# Patient Record
Sex: FEMALE | Race: WHITE
[De-identification: names, ages, dates, MRNs, and addresses within clinical notes are randomized per-mention and may not be internally consistent; named-entity substitution may affect disease eponyms.]

---

## 2017-01-09 ENCOUNTER — HOSPITAL ENCOUNTER (INPATIENT)
Dept: HOSPITAL 61 - ER | Age: 82
LOS: 2 days | Discharge: NURSING, REHAB OR LONG-TERM CARE | DRG: 683 | End: 2017-01-11
Attending: INTERNAL MEDICINE | Admitting: INTERNAL MEDICINE
Payer: MEDICARE

## 2017-01-09 VITALS
DIASTOLIC BLOOD PRESSURE: 72 MMHG | DIASTOLIC BLOOD PRESSURE: 72 MMHG | SYSTOLIC BLOOD PRESSURE: 119 MMHG | SYSTOLIC BLOOD PRESSURE: 119 MMHG

## 2017-01-09 VITALS — WEIGHT: 156.13 LBS | BODY MASS INDEX: 28.73 KG/M2 | HEIGHT: 62 IN

## 2017-01-09 VITALS — DIASTOLIC BLOOD PRESSURE: 71 MMHG | SYSTOLIC BLOOD PRESSURE: 120 MMHG

## 2017-01-09 DIAGNOSIS — R55: ICD-10-CM

## 2017-01-09 DIAGNOSIS — B19.10: ICD-10-CM

## 2017-01-09 DIAGNOSIS — Z86.61: ICD-10-CM

## 2017-01-09 DIAGNOSIS — K59.00: ICD-10-CM

## 2017-01-09 DIAGNOSIS — F32.9: ICD-10-CM

## 2017-01-09 DIAGNOSIS — N17.9: Primary | ICD-10-CM

## 2017-01-09 DIAGNOSIS — Z86.73: ICD-10-CM

## 2017-01-09 DIAGNOSIS — M85.80: ICD-10-CM

## 2017-01-09 DIAGNOSIS — N18.9: ICD-10-CM

## 2017-01-09 DIAGNOSIS — E03.9: ICD-10-CM

## 2017-01-09 DIAGNOSIS — E78.5: ICD-10-CM

## 2017-01-09 DIAGNOSIS — H40.9: ICD-10-CM

## 2017-01-09 DIAGNOSIS — M48.02: ICD-10-CM

## 2017-01-09 DIAGNOSIS — Z91.013: ICD-10-CM

## 2017-01-09 DIAGNOSIS — G89.29: ICD-10-CM

## 2017-01-09 DIAGNOSIS — M54.5: ICD-10-CM

## 2017-01-09 DIAGNOSIS — Z88.0: ICD-10-CM

## 2017-01-09 DIAGNOSIS — K57.90: ICD-10-CM

## 2017-01-09 DIAGNOSIS — F20.9: ICD-10-CM

## 2017-01-09 DIAGNOSIS — F41.9: ICD-10-CM

## 2017-01-09 DIAGNOSIS — Z90.81: ICD-10-CM

## 2017-01-09 DIAGNOSIS — H54.42: ICD-10-CM

## 2017-01-09 DIAGNOSIS — Z85.72: ICD-10-CM

## 2017-01-09 DIAGNOSIS — Z96.659: ICD-10-CM

## 2017-01-09 DIAGNOSIS — Z91.012: ICD-10-CM

## 2017-01-09 DIAGNOSIS — E11.22: ICD-10-CM

## 2017-01-09 DIAGNOSIS — I50.9: ICD-10-CM

## 2017-01-09 DIAGNOSIS — Z90.710: ICD-10-CM

## 2017-01-09 DIAGNOSIS — K21.9: ICD-10-CM

## 2017-01-09 LAB
ALBUMIN SERPL-MCNC: 3.8 G/DL (ref 3.4–5)
ALBUMIN/GLOB SERPL: 0.9 {RATIO} (ref 1–1.7)
ALP SERPL-CCNC: 168 U/L (ref 46–116)
ALT SERPL-CCNC: 31 U/L (ref 14–59)
ANION GAP SERPL CALC-SCNC: 11 MMOL/L (ref 6–14)
AST SERPL-CCNC: 27 U/L (ref 15–37)
BACTERIA #/AREA URNS HPF: 0 /HPF
BASOPHILS # BLD AUTO: 0.1 X10^3/UL (ref 0–0.2)
BASOPHILS NFR BLD: 1 % (ref 0–3)
BILIRUB SERPL-MCNC: 0.4 MG/DL (ref 0.2–1)
BILIRUB UR QL STRIP: NEGATIVE
BUN SERPL-MCNC: 22 MG/DL (ref 7–20)
BUN/CREAT SERPL: 15 (ref 6–20)
CALCIUM SERPL-MCNC: 10.3 MG/DL (ref 8.5–10.1)
CHLORIDE SERPL-SCNC: 100 MMOL/L (ref 98–107)
CO2 SERPL-SCNC: 27 MMOL/L (ref 21–32)
CREAT SERPL-MCNC: 1.5 MG/DL (ref 0.6–1)
EOSINOPHIL NFR BLD: 2 % (ref 0–3)
ERYTHROCYTE [DISTWIDTH] IN BLOOD BY AUTOMATED COUNT: 15.8 % (ref 11.5–14.5)
GFR SERPLBLD BASED ON 1.73 SQ M-ARVRAT: 33.5 ML/MIN
GLOBULIN SER-MCNC: 4.3 G/DL (ref 2.2–3.8)
GLUCOSE SERPL-MCNC: 129 MG/DL (ref 70–99)
GLUCOSE UR STRIP-MCNC: NEGATIVE MG/DL
HCT VFR BLD CALC: 44.2 % (ref 36–47)
HGB BLD-MCNC: 14.2 G/DL (ref 12–15.5)
LYMPHOCYTES # BLD: 2.6 X10^3/UL (ref 1–4.8)
LYMPHOCYTES NFR BLD AUTO: 29 % (ref 24–48)
MCH RBC QN AUTO: 29 PG (ref 25–35)
MCHC RBC AUTO-ENTMCNC: 32 G/DL (ref 31–37)
MCV RBC AUTO: 89 FL (ref 79–100)
MONOCYTES NFR BLD: 15 % (ref 0–9)
NEUTROPHILS NFR BLD AUTO: 53 % (ref 31–73)
NITRITE UR QL STRIP: NEGATIVE
PH UR STRIP: 7 [PH]
PLATELET # BLD AUTO: 407 X10^3/UL (ref 140–400)
POTASSIUM SERPL-SCNC: 4.1 MMOL/L (ref 3.5–5.1)
PROT SERPL-MCNC: 8.1 G/DL (ref 6.4–8.2)
PROT UR STRIP-MCNC: NEGATIVE MG/DL
RBC # BLD AUTO: 4.96 X10^6/UL (ref 3.5–5.4)
RBC #/AREA URNS HPF: 0 /HPF (ref 0–2)
SODIUM SERPL-SCNC: 138 MMOL/L (ref 136–145)
SP GR UR STRIP: 1.01
SQUAMOUS #/AREA URNS LPF: (no result) /LPF
UROBILINOGEN UR-MCNC: 0.2 MG/DL
WBC # BLD AUTO: 8.8 X10^3/UL (ref 4–11)
WBC #/AREA URNS HPF: (no result) /HPF (ref 0–4)

## 2017-01-09 PROCEDURE — 83735 ASSAY OF MAGNESIUM: CPT

## 2017-01-09 PROCEDURE — 96360 HYDRATION IV INFUSION INIT: CPT

## 2017-01-09 PROCEDURE — 80053 COMPREHEN METABOLIC PANEL: CPT

## 2017-01-09 PROCEDURE — 81001 URINALYSIS AUTO W/SCOPE: CPT

## 2017-01-09 PROCEDURE — 93005 ELECTROCARDIOGRAM TRACING: CPT

## 2017-01-09 PROCEDURE — 85027 COMPLETE CBC AUTOMATED: CPT

## 2017-01-09 PROCEDURE — 93306 TTE W/DOPPLER COMPLETE: CPT

## 2017-01-09 PROCEDURE — 84443 ASSAY THYROID STIM HORMONE: CPT

## 2017-01-09 PROCEDURE — 36415 COLL VENOUS BLD VENIPUNCTURE: CPT

## 2017-01-09 PROCEDURE — 84484 ASSAY OF TROPONIN QUANT: CPT

## 2017-01-09 NOTE — PHYS DOC
Past Medical History


Past Medical History:  Cancer, GERD, Hypothyroid, Other


Additional Past Medical Histor:  hepatitis B, mantle cell lymphoma, bacterial 

meningitis


Past Surgical History:  Hysterectomy, Knee Replacement, Other


Additional Past Surgical Histo:  spleenectomy, bone spur removal L4&L5


Alcohol Use:  None


Drug Use:  None





Adult General


Chief Complaint


Chief Complaint:  SYNCOPE





HPI


HPI


81-year-old female presents after she had a syncopal episode. She was recently 

admitted for the same at [facility]. Today she states she was visiting 

her  in the hospital when he was being moved by the physical therapist 

she became upset and then blacked out. She denies any chest pains or 

palpitations. She denies any shortness of breath or dyspnea on exertion. []





Review of Systems


Review of Systems





Constitutional: Denies fever or chills []


Eyes: Denies change in visual acuity, redness, or eye pain []


HENT: Denies nasal congestion or sore throat []


Respiratory: Denies cough or shortness of breath []


Cardiovascular: No additional information not addressed in HPI []


GI: Denies abdominal pain, nausea, vomiting, bloody stools or diarrhea []


: Denies dysuria or hematuria []


Musculoskeletal: Denies back pain or joint pain []


Integument: Denies rash or skin lesions []


Neurologic: Denies headache, focal weakness or sensory changes []


Endocrine: Denies polyuria or polydipsia []





Current Medications


Current Medications





 Current Medications








 Medications


  (Trade)  Dose


 Ordered  Sig/Anastasia  Start Time


 Stop Time Status Last Admin


Dose Admin


 


 Sodium Chloride


  (Iv Sodium


 Chloride 0.9%


 1000ml Bag)  1,000 ml @ 


 1,000 mls/hr  1X  ONCE  1/9/17 15:45


 1/9/17 16:44 DC 1/9/17 16:50


1,000 MLS/HR











Allergies


Allergies





 Allergies








Coded Allergies Type Severity Reaction Last Updated Verified


 


  Penicillins Allergy Severe sob 1/9/17 Yes


 


  egg Allergy Intermediate  6/30/16 Yes


 


  shellfish derived Allergy Intermediate  6/30/16 Yes











Physical Exam


Physical Exam





Constitutional: Well developed, well nourished, no acute distress, non-toxic 

appearance. []


HENT: Normocephalic, atraumatic, bilateral external ears normal, oropharynx 

moist, no oral exudates, nose normal. []


Eyes: PERRLA, EOMI, conjunctiva normal, no discharge. [] 


Neck: Normal range of motion, no tenderness, supple, no stridor. [] 


Cardiovascular:Heart rate regular rhythm, no murmur []


Lungs & Thorax:  Bilateral breath sounds clear to auscultation []


Abdomen: Bowel sounds normal, soft, no tenderness, no masses, no pulsatile 

masses. [] 


Skin: Warm, dry, no erythema, no rash. [] 


Back: No tenderness, no CVA tenderness. [] 


Extremities: No tenderness, no cyanosis, no clubbing, ROM intact, no edema. [] 


Neurologic: Alert and oriented X 3, normal motor function, normal sensory 

function, no focal deficits noted. []


Psychologic: Affect normal, judgement normal, mood normal. []





Current Patient Data


Vital Signs





 Vital Signs








  Date Time  Temp Pulse Resp B/P Pulse Ox O2 Delivery O2 Flow Rate FiO2


 


1/9/17 17:31  74 20 145/63 97 Room Air  








Lab Values





 Laboratory Tests








Test


  1/9/17


16:35 1/9/17


16:50


 


Urine Color Yellow   


 


Urine Clarity Clear   


 


Urine pH 7.0   


 


Urine Specific Gravity 1.010   


 


Urine Protein


  Negativemg/dL


(NEG-TRACE) 


 


 


Urine Glucose (UA)


  Negativemg/dL


(NEG) 


 


 


Urine Ketones (Stick)


  Negativemg/dL


(NEG) 


 


 


Urine Blood


  Negative (NEG)


  


 


 


Urine Nitrite


  Negative (NEG)


  


 


 


Urine Bilirubin


  Negative (NEG)


  


 


 


Urine Urobilinogen Dipstick


  0.2mg/dL (0.2


mg/dL) 


 


 


Urine Leukocyte Esterase


  Negative (NEG)


  


 


 


Urine RBC 0/HPF (0-2)   


 


Urine WBC Occ/HPF (0-4)   


 


Urine Squamous Epithelial


Cells Occ/LPF  


  


 


 


Urine Bacteria 0/HPF (0-FEW)   


 


Urine Hyaline Casts Moderate/HPF   


 


White Blood Count


  


  8.8x10^3/uL


(4.0-11.0)


 


Red Blood Count


  


  4.96x10^6/uL


(3.50-5.40)


 


Hemoglobin


  


  14.2g/dL


(12.0-15.5)


 


Hematocrit


  


  44.2%


(36.0-47.0)


 


Mean Corpuscular Volume  89fL ()  


 


Mean Corpuscular Hemoglobin  29pg (25-35)  


 


Mean Corpuscular Hemoglobin


Concent 


  32g/dL (31-37)


 


 


Red Cell Distribution Width


  


  15.8%


(11.5-14.5)  H


 


Platelet Count


  


  407x10^3/uL


(140-400)  H


 


Neutrophils (%) (Auto)  53% (31-73)  


 


Lymphocytes (%) (Auto)  29% (24-48)  


 


Monocytes (%) (Auto)  15% (0-9)  H


 


Eosinophils (%) (Auto)  2% (0-3)  


 


Basophils (%) (Auto)  1% (0-3)  


 


Neutrophils # (Auto)


  


  4.6x10^3uL


(1.8-7.7)


 


Lymphocytes # (Auto)


  


  2.6x10^3/uL


(1.0-4.8)


 


Monocytes # (Auto)


  


  1.3x10^3/uL


(0.0-1.1)  H


 


Eosinophils # (Auto)


  


  0.2x10^3/uL


(0.0-0.7)


 


Basophils # (Auto)


  


  0.1x10^3/uL


(0.0-0.2)


 


Sodium Level


  


  138mmol/L


(136-145)


 


Potassium Level


  


  4.1mmol/L


(3.5-5.1)


 


Chloride Level


  


  100mmol/L


()


 


Carbon Dioxide Level


  


  27mmol/L


(21-32)


 


Anion Gap  11 (6-14)  


 


Blood Urea Nitrogen


  


  22mg/dL (7-20)


H


 


Creatinine


  


  1.5mg/dL


(0.6-1.0)  H


 


Estimated GFR


(Cockcroft-Gault) 


  33.5  


 


 


BUN/Creatinine Ratio  15 (6-20)  


 


Glucose Level


  


  129mg/dL


(70-99)  H


 


Calcium Level


  


  10.3mg/dL


(8.5-10.1)  H


 


Total Bilirubin


  


  0.4mg/dL


(0.2-1.0)


 


Aspartate Amino Transferase


(AST) 


  27U/L (15-37)  


 


 


Alanine Aminotransferase (ALT)  31U/L (14-59)  


 


Alkaline Phosphatase


  


  168U/L


()  H


 


Troponin I Quantitative


  


  < 0.017ng/mL


(0.000-0.055)


 


Total Protein


  


  8.1g/dL


(6.4-8.2)


 


Albumin


  


  3.8g/dL


(3.4-5.0)


 


Albumin/Globulin Ratio


  


  0.9 (1.0-1.7)


L





 Laboratory Tests


1/9/17 16:50








 Laboratory Tests


1/9/17 16:50














EKG


EKG


EKG: Normal sinus rhythm rate of 75 without ischemic ST-T changes []





Radiology/Procedures


Radiology/Procedures


[]





Course & Med Decision Making


Course & Med Decision Making


Pertinent Labs and Imaging studies reviewed. (See chart for details)





[ED course: Evaluation reveals a 81-year-old female with what sounds like a 

vasovagal syncopal episode while in the hospital visiting her . During 

her stay in the emergency department she did have several episodes where she 

was symptomatic with her heart rate running into the 130s. I spoke with the 

hospitalist who agreed to accept the patient for admission]





Dragon Disclaimer


Dragon Disclaimer


This electronic medical record was generated, in whole or in part, using a 

voice recognition dictation system.





Departure


Departure


Impression:  


 Primary Impression:  


 Syncope


Disposition:  09 ADMITTED AS INPATIENT


Admitting Physician:  Iona Ventura


Condition:  STABLE








TALISHA BEST DO Jan 9, 2017 19:35

## 2017-01-09 NOTE — EKG
[facility]

              [address] [place] [address]

Test Date:    2017               Test Time:    15:05:06

Pat Name:     LUPE FRIEND          Department:   

Patient ID:   [id number]           Room:          

Gender:       F                        Technician:   

:          [id number]               Requested By: TALISHA BEST

Order Number: [id number]            Reading MD:   Tramaine Olmedo

                                 Measurements

Intervals                              Axis          

Rate:         75                       P:            48

NJ:           168                      QRS:          -31

QRSD:         86                       T:            42

QT:           368                                    

QTc:          413                                    

                           Interpretive Statements

SINUS RHYTHM

ABNORMAL LEFT AXIS DEVIATION

LEFT ANTERIOR FASCICULAR BLOCK



Electronically Signed On 1- 15:43:12 CST by Tramaine Olmedo

## 2017-01-09 NOTE — PDOC1
History and Physical


Date of Admission


Date of Admission


DATE: 1/9/17 


TIME: 23:10





Identification/Chief Complaint


Chief Complaint


syncope





Source


Source:  Chart review, Patient





History of Present Illness


History of Present Illness


pt well known to me,  prior admit for bacterial meningitis, long stay, 

including LTAC last year


She now was admitted from ER for syncope.   Lost consciousness in her husbands 

roomm,  536 here.  she is upset about his condition.


She was just discharged from [facility] today, s./p syncope w/u,  she is 

scheduled for an echo, and to f/u with Dr. Honorio mclani.





Past Medical History


Cardiovascular:  No pertinent hx


Pulmonary:  No pertinent hx


CENTRAL NERVOUS SYSTEM:  Other


GI:  Diverticulosis, GERD


Heme/Onc:  No pertinent hx, Other


Hepatobiliary:  Hep A/B/C


Psych:  No pertinent hx


Musculoskeletal:   low back pain, Osteoarthritis


Infectious disease:  No pertinent hx


Renal/:  No pertinent hx, Benign prostatic enlarg.


Endocrine:  No pertinent hx





Past Surgical History


Past Surgical History:  Other





Family History


Family History:  No Significant





Social History


Smoke:  No


ALCOHOL:  none


Drugs:  None





Current Problem List


Problem List


 Problems


Medical Problems:


(1) Syncope


Status: Acute  








Problems:  





Current Medications


Current Medications





 Current Medications


Sodium Chloride (Iv Sodium Chloride 0.9% 1000ml Bag) 1,000 ml @  1,000 mls/hr 

1X  ONCE IV  Last administered on 1/9/17at 16:50;  Start 1/9/17 at 15:45;  Stop 

1/9/17 at 16:44;  Status DC


Bumetanide (Bumex) 1 mg QSUTUTH PO ;  Start 1/10/17 at 16:00


Bupropion HCl (Wellbutrin Sr) 100 mg DAILY PO ;  Start 1/10/17 at 09:00


Cyclopentolate HCl (Cyclogyl) 1 drop DAILY OS ;  Start 1/10/17 at 09:00


Docusate Sodium (Colace) 100 mg BID PO ;  Start 1/10/17 at 09:00


Famotidine (Pepcid) 20 mg DAILY PO ;  Start 1/10/17 at 09:00


Levofloxacin (Levaquin) 500 mg 1X  ONCE PO ;  Start 1/10/17 at 06:00;  Stop 1/10

/17 at 06:01


Levothyroxine Sodium (Synthroid) 25 mcg DAILY07 PO ;  Start 1/10/17 at 07:00


Magnesium Oxide (Magnesium Oxide) 400 mg TIDWMEALS PO ;  Start 1/10/17 at 08:00


Polyethylene Glycol (miraLAX PACKET) 17 gm DAILY PO ;  Start 1/10/17 at 09:00


Spironolactone (Aldactone) 25 mg DAILY PO ;  Start 1/10/17 at 09:00


Levofloxacin (Levaquin) 250 mg DAILY06 PO ;  Start 1/11/17 at 06:00





Active Scripts


Active


Nystop (Nystatin) 60 Gm Powder 1 Asim TP TID


Mag-Oxide (Magnesium Oxide) 400 Mg Tablet 400 Mg PO TIDWMEALS


Lidoderm (Lidocaine) 700 Mg Adh..patch 1 Patch TD DAILY


Ciprofloxacin Hcl 2.5 Ml Drops 1 Drop OS QID


Baclofen 10 Mg Tablet 10 Mg PO PRN Q8HRS PRN


Atorvastatin Calcium 10 Mg Tablet 10 Mg PO QHS


Aspirin 325 Mg Tablet 325 Mg PO DAILYWBKFT


Reported


Bumetanide 1 Mg Tablet   QSUTUTH


Synthroid (Levothyroxine Sodium) 25 Mcg Tablet 1 Tab PO DAILY


Colace (Docusate Sodium) 100 Mg Capsule 1 Cap PO BID


Levaquin (Levofloxacin) 500 Mg Tablet 1 Tab PO DAILY


Pred-G 1% Eye Drops (Gentamicin/Prednisol Ac) 5 Ml Drops.susp   QID


Cyclopentolate Hcl 2 Ml Drops   


Bupropion Hcl Sr (Bupropion Hcl) 100 Mg Tablet.er   DAILY


Famotidine 20 Mg Tablet 1 Tab PO BID


Miralax (Polyethylene Glycol 3350) 17 Gm Powd.pack 1 Packet PO DAILY


Aldactone (Spironolactone) 25 Mg Tablet 25 Mg PO DAILY





Allergies


Allergies:  


Coded Allergies:  


     Penicillins (Verified  Allergy, Severe, sob, 1/9/17)


     egg (Verified  Allergy, Intermediate, 6/30/16)


     shellfish derived (Verified  Allergy, Intermediate, 6/30/16)





ROS


General:  YES: Appetite, 


   No: Chills, Fatigue, Malaise, Night Sweats, Other


PSYCHOLOGICAL ROS:  YES: Concentration difficultie, 


   No: Anxiety, Behavioral Disorder, Decreased libido, Depression, 

Disorientation, Hallucinations, Hostility, Irritablity, Memory difficulties, 

Mood Swings, Other, Physical abuse, Sexual abuse, Sleep disturbances, Suicidal 

ideation


Eyes:  No Blurry vision, No Decreased vision, No Double vision, No Dry eyes, No 

Excessive tearing, No Eye Pain, No Itchy Eyes, No Loss of vision, No Other, No 

Photophobia, No Scotomata, No Uses contacts, No Uses glasses


Respiratory:  No: Cough, Hemoptysis, Orthopnea, Other, Pleuritic Pain, SOB with 

excertion, Shortness of breath, Sputum Changes, Stridor, Tachypnea, Wheezing


Cardiovascular:  No Chest Pain, No Edema, No Lt Headedness, No Orthopnea, No 

Other, No Palpitations, No Paroxysmal Noc. Dyspnea


Gastrointestinal:  No Abdominal Pain, No Constipation, No Diarrhea, No 

Hematochezia, No Melena, No Nausea, No Other, No Vomiting


Genitourinary:  No , No , No , No , No , No , No , No Discharge, No Dysuria, No 

Flank Pain, No Frequency, No Hematuria, No Incontinence, No Other, No Pain, No 

Retention, No Urgency


Musculoskeletal:  No Gait Disturbance, No Joint Pain, No Joint Stiffness, No 

Joint Swelling, No Muscle Pain, No Muscular Weakness, No Other, No Pain In:, No 

Swelling In:


Neurological:  Yes Other, 


   No Behavorial Changes, No Bowel/Bladder ControlChng, No Confusion, No 

Dizziness, No Gait Disturbance, No Headaches, No Impaired Coord/balance, No 

Memory Loss, No Numbness/Tingling, No Seizures, No Speech Problems, No Tremors, 

No Visual Changes, No Weakness


Skin:  No Acne, No Dry Skin, No Eczema, No Hair Changes, No Lumps, No Mole 

Changes, No Mottling, No Nail Changes, No Other, No Pruritus, No Rash, No Skin 

Lesion Changes





Physical Exam


General:  Alert, Oriented X3, No acute distress


HEENT:  Atraumatic, EOMI, Mucous membr. moist/pink


Lungs:  Normal air movement


Abdomen:  Normal bowel sounds, Soft, No tenderness


Rectal Exam:  not examined


Extremities:  No clubbing, No cyanosis, No edema, Normal pulses


Skin:  No rashes, No breakdown, No significant lesion


Neuro:  Normal speech, Sensation intact


Psych/Mental Status:  Mental status NL, Mood NL





Vitals


Vitals





 Vital Signs








  Date Time  Temp Pulse Resp B/P Pulse Ox O2 Delivery O2 Flow Rate FiO2


 


1/9/17 19:04  85 16 138/63 97 Room Air  











Labs


Labs





Laboratory Tests








Test


  1/9/17


16:35 1/9/17


16:50


 


Urine Color Yellow  


 


Urine Clarity Clear  


 


Urine pH 7.0  


 


Urine Specific Gravity 1.010  


 


Urine Protein


  Negativemg/dL


(NEG-TRACE) 


 


 


Urine Glucose (UA)


  Negativemg/dL


(NEG) 


 


 


Urine Ketones (Stick)


  Negativemg/dL


(NEG) 


 


 


Urine Blood Negative (NEG)  


 


Urine Nitrite Negative (NEG)  


 


Urine Bilirubin Negative (NEG)  


 


Urine Urobilinogen Dipstick


  0.2mg/dL (0.2


mg/dL) 


 


 


Urine Leukocyte Esterase Negative (NEG)  


 


Urine RBC 0/HPF (0-2)  


 


Urine WBC Occ/HPF (0-4)  


 


Urine Squamous Epithelial


Cells Occ/LPF 


  


 


 


Urine Bacteria 0/HPF (0-FEW)  


 


Urine Hyaline Casts Moderate/HPF  


 


White Blood Count


  


  8.8x10^3/uL


(4.0-11.0)


 


Red Blood Count


  


  4.96x10^6/uL


(3.50-5.40)


 


Hemoglobin


  


  14.2g/dL


(12.0-15.5)


 


Hematocrit


  


  44.2%


(36.0-47.0)


 


Mean Corpuscular Volume  89fL () 


 


Mean Corpuscular Hemoglobin  29pg (25-35) 


 


Mean Corpuscular Hemoglobin


Concent 


  32g/dL (31-37) 


 


 


Red Cell Distribution Width


  


  15.8%


(11.5-14.5)


 


Platelet Count


  


  407x10^3/uL


(140-400)


 


Neutrophils (%) (Auto)  53% (31-73) 


 


Lymphocytes (%) (Auto)  29% (24-48) 


 


Monocytes (%) (Auto)  15% (0-9) 


 


Eosinophils (%) (Auto)  2% (0-3) 


 


Basophils (%) (Auto)  1% (0-3) 


 


Neutrophils # (Auto)


  


  4.6x10^3uL


(1.8-7.7)


 


Lymphocytes # (Auto)


  


  2.6x10^3/uL


(1.0-4.8)


 


Monocytes # (Auto)


  


  1.3x10^3/uL


(0.0-1.1)


 


Eosinophils # (Auto)


  


  0.2x10^3/uL


(0.0-0.7)


 


Basophils # (Auto)


  


  0.1x10^3/uL


(0.0-0.2)


 


Sodium Level


  


  138mmol/L


(136-145)


 


Potassium Level


  


  4.1mmol/L


(3.5-5.1)


 


Chloride Level


  


  100mmol/L


()


 


Carbon Dioxide Level


  


  27mmol/L


(21-32)


 


Anion Gap  11 (6-14) 


 


Blood Urea Nitrogen  22mg/dL (7-20) 


 


Creatinine


  


  1.5mg/dL


(0.6-1.0)


 


Estimated GFR


(Cockcroft-Gault) 


  33.5 


 


 


BUN/Creatinine Ratio  15 (6-20) 


 


Glucose Level


  


  129mg/dL


(70-99)


 


Calcium Level


  


  10.3mg/dL


(8.5-10.1)


 


Total Bilirubin


  


  0.4mg/dL


(0.2-1.0)


 


Aspartate Amino Transf


(AST/SGOT) 


  27U/L (15-37) 


 


 


Alanine Aminotransferase


(ALT/SGPT) 


  31U/L (14-59) 


 


 


Alkaline Phosphatase


  


  168U/L


()


 


Troponin I Quantitative


  


  < 0.017ng/mL


(0.000-0.055)


 


Total Protein


  


  8.1g/dL


(6.4-8.2)


 


Albumin


  


  3.8g/dL


(3.4-5.0)


 


Albumin/Globulin Ratio  0.9 (1.0-1.7) 








Laboratory Tests








Test


  1/9/17


16:35 1/9/17


16:50


 


Urine Color Yellow  


 


Urine Clarity Clear  


 


Urine pH 7.0  


 


Urine Specific Gravity 1.010  


 


Urine Protein


  Negativemg/dL


(NEG-TRACE) 


 


 


Urine Glucose (UA)


  Negativemg/dL


(NEG) 


 


 


Urine Ketones (Stick)


  Negativemg/dL


(NEG) 


 


 


Urine Blood Negative (NEG)  


 


Urine Nitrite Negative (NEG)  


 


Urine Bilirubin Negative (NEG)  


 


Urine Urobilinogen Dipstick


  0.2mg/dL (0.2


mg/dL) 


 


 


Urine Leukocyte Esterase Negative (NEG)  


 


Urine RBC 0/HPF (0-2)  


 


Urine WBC Occ/HPF (0-4)  


 


Urine Squamous Epithelial


Cells Occ/LPF 


  


 


 


Urine Bacteria 0/HPF (0-FEW)  


 


Urine Hyaline Casts Moderate/HPF  


 


White Blood Count


  


  8.8x10^3/uL


(4.0-11.0)


 


Red Blood Count


  


  4.96x10^6/uL


(3.50-5.40)


 


Hemoglobin


  


  14.2g/dL


(12.0-15.5)


 


Hematocrit


  


  44.2%


(36.0-47.0)


 


Mean Corpuscular Volume  89fL () 


 


Mean Corpuscular Hemoglobin  29pg (25-35) 


 


Mean Corpuscular Hemoglobin


Concent 


  32g/dL (31-37) 


 


 


Red Cell Distribution Width


  


  15.8%


(11.5-14.5)


 


Platelet Count


  


  407x10^3/uL


(140-400)


 


Neutrophils (%) (Auto)  53% (31-73) 


 


Lymphocytes (%) (Auto)  29% (24-48) 


 


Monocytes (%) (Auto)  15% (0-9) 


 


Eosinophils (%) (Auto)  2% (0-3) 


 


Basophils (%) (Auto)  1% (0-3) 


 


Neutrophils # (Auto)


  


  4.6x10^3uL


(1.8-7.7)


 


Lymphocytes # (Auto)


  


  2.6x10^3/uL


(1.0-4.8)


 


Monocytes # (Auto)


  


  1.3x10^3/uL


(0.0-1.1)


 


Eosinophils # (Auto)


  


  0.2x10^3/uL


(0.0-0.7)


 


Basophils # (Auto)


  


  0.1x10^3/uL


(0.0-0.2)


 


Sodium Level


  


  138mmol/L


(136-145)


 


Potassium Level


  


  4.1mmol/L


(3.5-5.1)


 


Chloride Level


  


  100mmol/L


()


 


Carbon Dioxide Level


  


  27mmol/L


(21-32)


 


Anion Gap  11 (6-14) 


 


Blood Urea Nitrogen  22mg/dL (7-20) 


 


Creatinine


  


  1.5mg/dL


(0.6-1.0)


 


Estimated GFR


(Cockcroft-Gault) 


  33.5 


 


 


BUN/Creatinine Ratio  15 (6-20) 


 


Glucose Level


  


  129mg/dL


(70-99)


 


Calcium Level


  


  10.3mg/dL


(8.5-10.1)


 


Total Bilirubin


  


  0.4mg/dL


(0.2-1.0)


 


Aspartate Amino Transf


(AST/SGOT) 


  27U/L (15-37) 


 


 


Alanine Aminotransferase


(ALT/SGPT) 


  31U/L (14-59) 


 


 


Alkaline Phosphatase


  


  168U/L


()


 


Troponin I Quantitative


  


  < 0.017ng/mL


(0.000-0.055)


 


Total Protein


  


  8.1g/dL


(6.4-8.2)


 


Albumin


  


  3.8g/dL


(3.4-5.0)


 


Albumin/Globulin Ratio  0.9 (1.0-1.7) 











VTE Prophylaxis Ordered


VTE Prophylaxis Devices:  Yes


VTE Pharmacological Prophylaxi:  Yes





Assessment/Plan


Assessment/Plan


syncope,  poss vagal, consult CV< she was supposed to f.u with them soon


check echo





admit, obs








MARC MALAGON MD Jan 9, 2017 23:14

## 2017-01-10 VITALS — DIASTOLIC BLOOD PRESSURE: 59 MMHG | SYSTOLIC BLOOD PRESSURE: 122 MMHG

## 2017-01-10 VITALS — SYSTOLIC BLOOD PRESSURE: 123 MMHG | DIASTOLIC BLOOD PRESSURE: 59 MMHG

## 2017-01-10 VITALS — DIASTOLIC BLOOD PRESSURE: 39 MMHG | SYSTOLIC BLOOD PRESSURE: 94 MMHG

## 2017-01-10 VITALS — DIASTOLIC BLOOD PRESSURE: 63 MMHG | SYSTOLIC BLOOD PRESSURE: 120 MMHG

## 2017-01-10 VITALS — DIASTOLIC BLOOD PRESSURE: 67 MMHG | SYSTOLIC BLOOD PRESSURE: 121 MMHG

## 2017-01-10 VITALS — SYSTOLIC BLOOD PRESSURE: 113 MMHG | DIASTOLIC BLOOD PRESSURE: 59 MMHG

## 2017-01-10 VITALS — DIASTOLIC BLOOD PRESSURE: 59 MMHG | SYSTOLIC BLOOD PRESSURE: 112 MMHG

## 2017-01-10 VITALS — SYSTOLIC BLOOD PRESSURE: 112 MMHG | DIASTOLIC BLOOD PRESSURE: 83 MMHG

## 2017-01-10 VITALS — DIASTOLIC BLOOD PRESSURE: 62 MMHG | SYSTOLIC BLOOD PRESSURE: 115 MMHG

## 2017-01-10 RX ADMIN — POLYETHYLENE GLYCOL 3350 SCH GM: 17 POWDER, FOR SOLUTION ORAL at 08:55

## 2017-01-10 RX ADMIN — CIPROFLOXACIN HYDROCHLORIDE SCH DROP: 3 SOLUTION/ DROPS OPHTHALMIC at 20:51

## 2017-01-10 RX ADMIN — DOCUSATE SODIUM SCH MG: 100 CAPSULE, LIQUID FILLED ORAL at 20:51

## 2017-01-10 RX ADMIN — MAGNESIUM OXIDE TAB 400 MG (241.3 MG ELEMENTAL MG) SCH MG: 400 (241.3 MG) TAB at 08:55

## 2017-01-10 RX ADMIN — DOCUSATE SODIUM SCH MG: 100 CAPSULE, LIQUID FILLED ORAL at 08:56

## 2017-01-10 RX ADMIN — ASPIRIN 325 MG ORAL TABLET SCH MG: 325 PILL ORAL at 08:55

## 2017-01-10 RX ADMIN — CIPROFLOXACIN HYDROCHLORIDE SCH DROP: 3 SOLUTION/ DROPS OPHTHALMIC at 08:57

## 2017-01-10 RX ADMIN — MAGNESIUM OXIDE TAB 400 MG (241.3 MG ELEMENTAL MG) SCH MG: 400 (241.3 MG) TAB at 12:30

## 2017-01-10 RX ADMIN — TOBRAMYCIN AND DEXAMETHASONE SCH DROP: 3; 1 SUSPENSION/ DROPS OPHTHALMIC at 23:54

## 2017-01-10 RX ADMIN — BUPROPION HYDROCHLORIDE SCH MG: 100 TABLET, EXTENDED RELEASE ORAL at 08:56

## 2017-01-10 RX ADMIN — CYCLOPENTOLATE HYDROCHLORIDE SCH DROP: 10 SOLUTION OPHTHALMIC at 08:53

## 2017-01-10 RX ADMIN — SPIRONOLACTONE SCH MG: 25 TABLET ORAL at 08:56

## 2017-01-10 RX ADMIN — MAGNESIUM OXIDE TAB 400 MG (241.3 MG ELEMENTAL MG) SCH MG: 400 (241.3 MG) TAB at 17:04

## 2017-01-10 RX ADMIN — LEVOTHYROXINE SODIUM SCH MCG: 25 TABLET ORAL at 06:24

## 2017-01-10 RX ADMIN — FAMOTIDINE SCH MG: 20 TABLET, FILM COATED ORAL at 08:58

## 2017-01-10 RX ADMIN — CIPROFLOXACIN HYDROCHLORIDE SCH DROP: 3 SOLUTION/ DROPS OPHTHALMIC at 17:04

## 2017-01-10 RX ADMIN — CIPROFLOXACIN HYDROCHLORIDE SCH DROP: 3 SOLUTION/ DROPS OPHTHALMIC at 12:30

## 2017-01-10 NOTE — CARD
--------------- APPROVED REPORT --------------





EXAM: Two-dimensional and M-mode echocardiogram with Doppler and color Doppler.



Other Information 

Quality : Average

Rhythm : NSR



INDICATION

Syncope 



2D DIMENSIONS 

RVDd2.5 (2.9-3.5cm)Left Atrium(2D)3.0 (1.6-4.0cm)

IVSd0.8 (0.7-1.1cm)Aortic Root(2D)2.3 (2.0-3.7cm)

LVDd4.1 (3.9-5.9cm)LVOT Diameter2.0 (1.8-2.4cm)

PWd0.8 (0.7-1.1cm)LVDs2.5 (2.5-4.0cm)

FS (%) 32.8 %SV50.2 ml

LVEF(%)64.4 (>50%)



Aortic Valve

AoV Peak Maximilian.137.7cm/sAoV VTI26.7cm

AO Peak GR.7.6mmHgLVOT Peak Maximilian.79.8cm/s

LVOT  VTI 15.03cmAO Mean GR.4mmHg

KIKO (VMAX)1.56ts1MZN   (VTI)1.74cm2



Mitral Valve

MV E Rnbzuejo793.6cm/sMV DECEL [id number]

MV A Jkzsfknd912.2cm/sMV E Mean Gr.3mmHg

MV EMG19svK/A  Ratio0.9

MV A Ycsnbymy057bkNNL (PHT)4.16cm2



TDI

E/Lateral E'13.5E/Medial E'15.5



Pulmonary Valve

PV Peak Cfnnmytf11.3cm/sPV Peak Grad.2mmHg

RVOT VTI12.7cm



Tricuspid Valve

TR P. Nyuvoivm977nm/sRAP GPAUUTIK7jrTl

TR Peak Gr.33kcYlFMAS73cxYo



Pulmonary Vein

S1 Hcieadgu39.3cm/sD2 Fuqlrxdy77.1cm/s



 LEFT VENTRICLE 

The left ventricle is normal size. There is normal left ventricular wall thickness. Left ventricle sy
stolic function is normal. The Ejection Fraction is 60-65%. There is normal LV segmental wall motion.
 The left ventricular diastolic function and filling is normal for age.



 RIGHT VENTRICLE 

The right ventricle is normal size. The right ventricular systolic function is normal.



 ATRIA 

The left atrium size is normal. The right atrium size is normal. The interatrial septum is intact wit
h no evidence for an atrial septal defect or patent foramen ovale as noted on 2-D or Doppler imaging.




 AORTIC VALVE 

The aortic valve is normal in structure and function. The aortic valve is trileaflet. Doppler and Col
or Flow revealed no significant aortic regurgitation. There is no significant aortic valvular stenosi
s.



 MITRAL VALVE 

The mitral valve is normal in structure and function. There is no mitral valve stenosis. Doppler and 
Color Flow revealed mild mitral regurgitation.



 TRICUSPID VALVE 

The tricuspid valve is normal in structure and function. Doppler and Color Flow revealed mild tricusp
id regurgitation. The PA pressure was estimated at 27 mmHg. There is no tricuspid valve stenosis.



 PULMONIC VALVE 

The pulmonic valve is not well visualized. Doppler and Color Flow revealed no pulmonic valvular regur
gitation. There is no pulmonic valvular stenosis.



 GREAT VESSELS 

The aortic root is normal in size. Normal pulmonary venous flow (Doppler). The IVC is normal in size 
and collapses >50% with inspiration.



 PERICARDIAL EFFUSION 

There is no evidence of significant pericardial effusion.



Critical Notification

Critical Value: No



<Conclusion>

Left ventricle systolic function is normal. The Ejection Fraction is 60-65%.

There is normal LV segmental wall motion.

## 2017-01-10 NOTE — PDOC
PROGRESS NOTES


Chief Complaint


Chief Complaint


syncope





History of Present Illness


History of Present Illness


1. VAso vaga; syncope


2. Anxiety sec to 's current medical condition


3. SHAYE on CKD: 


4. Hypothyroidism: On replacement. 


5. Hx of chronic back pain


6. Hx of TIA and lymphoma 


7. HLP:


8. Hx of meningitis and encephalopathy





Vitals


Vitals


NO issues


CARds note reviewed, echo then possible event monitor if echo unrevealing


But likely vaso timmy brought about by severe anxiety from husbands worsening 

dementia (did not recognize her)


 Vital Signs








  Date Time  Temp Pulse Resp B/P Pulse Ox O2 Delivery O2 Flow Rate FiO2


 


1/10/17 13:10  83  121/67    


 


1/10/17 11:00 96.3  20  98 Room Air  





 96.3       











Physical Exam


General:  Alert, Oriented X3, Cooperative, No acute distress


Heart:  Regular rate, Normal S1, Normal S2, Other (2/6 systolic murmur to LLS 

border)


Lungs:  Clear


Abdomen:  Soft, No tenderness


Extremities:  No cyanosis, No edema


Skin:  No breakdown, No significant lesion





Labs


LABS





Laboratory Tests








Test


  1/9/17


16:35 1/9/17


16:50


 


Urine Color Yellow  


 


Urine Clarity Clear  


 


Urine pH 7.0  


 


Urine Specific Gravity 1.010  


 


Urine Protein


  Negativemg/dL


(NEG-TRACE) 


 


 


Urine Glucose (UA)


  Negativemg/dL


(NEG) 


 


 


Urine Ketones (Stick)


  Negativemg/dL


(NEG) 


 


 


Urine Blood Negative (NEG)  


 


Urine Nitrite Negative (NEG)  


 


Urine Bilirubin Negative (NEG)  


 


Urine Urobilinogen Dipstick


  0.2mg/dL (0.2


mg/dL) 


 


 


Urine Leukocyte Esterase Negative (NEG)  


 


Urine RBC 0/HPF (0-2)  


 


Urine WBC Occ/HPF (0-4)  


 


Urine Squamous Epithelial


Cells Occ/LPF 


  


 


 


Urine Bacteria 0/HPF (0-FEW)  


 


Urine Hyaline Casts Moderate/HPF  


 


White Blood Count


  


  8.8x10^3/uL


(4.0-11.0)


 


Red Blood Count


  


  4.96x10^6/uL


(3.50-5.40)


 


Hemoglobin


  


  14.2g/dL


(12.0-15.5)


 


Hematocrit


  


  44.2%


(36.0-47.0)


 


Mean Corpuscular Volume  89fL () 


 


Mean Corpuscular Hemoglobin  29pg (25-35) 


 


Mean Corpuscular Hemoglobin


Concent 


  32g/dL (31-37) 


 


 


Red Cell Distribution Width


  


  15.8%


(11.5-14.5)


 


Platelet Count


  


  407x10^3/uL


(140-400)


 


Neutrophils (%) (Auto)  53% (31-73) 


 


Lymphocytes (%) (Auto)  29% (24-48) 


 


Monocytes (%) (Auto)  15% (0-9) 


 


Eosinophils (%) (Auto)  2% (0-3) 


 


Basophils (%) (Auto)  1% (0-3) 


 


Neutrophils # (Auto)


  


  4.6x10^3uL


(1.8-7.7)


 


Lymphocytes # (Auto)


  


  2.6x10^3/uL


(1.0-4.8)


 


Monocytes # (Auto)


  


  1.3x10^3/uL


(0.0-1.1)


 


Eosinophils # (Auto)


  


  0.2x10^3/uL


(0.0-0.7)


 


Basophils # (Auto)


  


  0.1x10^3/uL


(0.0-0.2)


 


Sodium Level


  


  138mmol/L


(136-145)


 


Potassium Level


  


  4.1mmol/L


(3.5-5.1)


 


Chloride Level


  


  100mmol/L


()


 


Carbon Dioxide Level


  


  27mmol/L


(21-32)


 


Anion Gap  11 (6-14) 


 


Blood Urea Nitrogen  22mg/dL (7-20) 


 


Creatinine


  


  1.5mg/dL


(0.6-1.0)


 


Estimated GFR


(Cockcroft-Gault) 


  33.5 


 


 


BUN/Creatinine Ratio  15 (6-20) 


 


Glucose Level


  


  129mg/dL


(70-99)


 


Calcium Level


  


  10.3mg/dL


(8.5-10.1)


 


Total Bilirubin


  


  0.4mg/dL


(0.2-1.0)


 


Aspartate Amino Transf


(AST/SGOT) 


  27U/L (15-37) 


 


 


Alanine Aminotransferase


(ALT/SGPT) 


  31U/L (14-59) 


 


 


Alkaline Phosphatase


  


  168U/L


()


 


Troponin I Quantitative


  


  < 0.017ng/mL


(0.000-0.055)


 


Total Protein


  


  8.1g/dL


(6.4-8.2)


 


Albumin


  


  3.8g/dL


(3.4-5.0)


 


Albumin/Globulin Ratio  0.9 (1.0-1.7) 











Review of Systems


Review of Systems


all 14 pt reviewed, only positive is anxiety - gets teary eyed when talking 

about her 





Assessment and Plan


Assessmemt and Plan


ff up echo result


Await PT/OT


Check TSH


Possible event monitor as oP\


HOme sergey if all else is neg Problems


Medical Problems:


(1) Syncope


Status: Acute  





Problems:  





Comment


Review of Relevant


I have reviewed the following items lala (where applicable) has been applied.


Labs





Laboratory Tests








Test


  1/9/17


16:35 1/9/17


16:50


 


Urine Color Yellow  


 


Urine Clarity Clear  


 


Urine pH 7.0  


 


Urine Specific Gravity 1.010  


 


Urine Protein


  Negativemg/dL


(NEG-TRACE) 


 


 


Urine Glucose (UA)


  Negativemg/dL


(NEG) 


 


 


Urine Ketones (Stick)


  Negativemg/dL


(NEG) 


 


 


Urine Blood Negative (NEG)  


 


Urine Nitrite Negative (NEG)  


 


Urine Bilirubin Negative (NEG)  


 


Urine Urobilinogen Dipstick


  0.2mg/dL (0.2


mg/dL) 


 


 


Urine Leukocyte Esterase Negative (NEG)  


 


Urine RBC 0/HPF (0-2)  


 


Urine WBC Occ/HPF (0-4)  


 


Urine Squamous Epithelial


Cells Occ/LPF 


  


 


 


Urine Bacteria 0/HPF (0-FEW)  


 


Urine Hyaline Casts Moderate/HPF  


 


White Blood Count


  


  8.8x10^3/uL


(4.0-11.0)


 


Red Blood Count


  


  4.96x10^6/uL


(3.50-5.40)


 


Hemoglobin


  


  14.2g/dL


(12.0-15.5)


 


Hematocrit


  


  44.2%


(36.0-47.0)


 


Mean Corpuscular Volume  89fL () 


 


Mean Corpuscular Hemoglobin  29pg (25-35) 


 


Mean Corpuscular Hemoglobin


Concent 


  32g/dL (31-37) 


 


 


Red Cell Distribution Width


  


  15.8%


(11.5-14.5)


 


Platelet Count


  


  407x10^3/uL


(140-400)


 


Neutrophils (%) (Auto)  53% (31-73) 


 


Lymphocytes (%) (Auto)  29% (24-48) 


 


Monocytes (%) (Auto)  15% (0-9) 


 


Eosinophils (%) (Auto)  2% (0-3) 


 


Basophils (%) (Auto)  1% (0-3) 


 


Neutrophils # (Auto)


  


  4.6x10^3uL


(1.8-7.7)


 


Lymphocytes # (Auto)


  


  2.6x10^3/uL


(1.0-4.8)


 


Monocytes # (Auto)


  


  1.3x10^3/uL


(0.0-1.1)


 


Eosinophils # (Auto)


  


  0.2x10^3/uL


(0.0-0.7)


 


Basophils # (Auto)


  


  0.1x10^3/uL


(0.0-0.2)


 


Sodium Level


  


  138mmol/L


(136-145)


 


Potassium Level


  


  4.1mmol/L


(3.5-5.1)


 


Chloride Level


  


  100mmol/L


()


 


Carbon Dioxide Level


  


  27mmol/L


(21-32)


 


Anion Gap  11 (6-14) 


 


Blood Urea Nitrogen  22mg/dL (7-20) 


 


Creatinine


  


  1.5mg/dL


(0.6-1.0)


 


Estimated GFR


(Cockcroft-Gault) 


  33.5 


 


 


BUN/Creatinine Ratio  15 (6-20) 


 


Glucose Level


  


  129mg/dL


(70-99)


 


Calcium Level


  


  10.3mg/dL


(8.5-10.1)


 


Total Bilirubin


  


  0.4mg/dL


(0.2-1.0)


 


Aspartate Amino Transf


(AST/SGOT) 


  27U/L (15-37) 


 


 


Alanine Aminotransferase


(ALT/SGPT) 


  31U/L (14-59) 


 


 


Alkaline Phosphatase


  


  168U/L


()


 


Troponin I Quantitative


  


  < 0.017ng/mL


(0.000-0.055)


 


Total Protein


  


  8.1g/dL


(6.4-8.2)


 


Albumin


  


  3.8g/dL


(3.4-5.0)


 


Albumin/Globulin Ratio  0.9 (1.0-1.7) 








Laboratory Tests








Test


  1/9/17


16:35 1/9/17


16:50


 


Urine Color Yellow  


 


Urine Clarity Clear  


 


Urine pH 7.0  


 


Urine Specific Gravity 1.010  


 


Urine Protein


  Negativemg/dL


(NEG-TRACE) 


 


 


Urine Glucose (UA)


  Negativemg/dL


(NEG) 


 


 


Urine Ketones (Stick)


  Negativemg/dL


(NEG) 


 


 


Urine Blood Negative (NEG)  


 


Urine Nitrite Negative (NEG)  


 


Urine Bilirubin Negative (NEG)  


 


Urine Urobilinogen Dipstick


  0.2mg/dL (0.2


mg/dL) 


 


 


Urine Leukocyte Esterase Negative (NEG)  


 


Urine RBC 0/HPF (0-2)  


 


Urine WBC Occ/HPF (0-4)  


 


Urine Squamous Epithelial


Cells Occ/LPF 


  


 


 


Urine Bacteria 0/HPF (0-FEW)  


 


Urine Hyaline Casts Moderate/HPF  


 


White Blood Count


  


  8.8x10^3/uL


(4.0-11.0)


 


Red Blood Count


  


  4.96x10^6/uL


(3.50-5.40)


 


Hemoglobin


  


  14.2g/dL


(12.0-15.5)


 


Hematocrit


  


  44.2%


(36.0-47.0)


 


Mean Corpuscular Volume  89fL () 


 


Mean Corpuscular Hemoglobin  29pg (25-35) 


 


Mean Corpuscular Hemoglobin


Concent 


  32g/dL (31-37) 


 


 


Red Cell Distribution Width


  


  15.8%


(11.5-14.5)


 


Platelet Count


  


  407x10^3/uL


(140-400)


 


Neutrophils (%) (Auto)  53% (31-73) 


 


Lymphocytes (%) (Auto)  29% (24-48) 


 


Monocytes (%) (Auto)  15% (0-9) 


 


Eosinophils (%) (Auto)  2% (0-3) 


 


Basophils (%) (Auto)  1% (0-3) 


 


Neutrophils # (Auto)


  


  4.6x10^3uL


(1.8-7.7)


 


Lymphocytes # (Auto)


  


  2.6x10^3/uL


(1.0-4.8)


 


Monocytes # (Auto)


  


  1.3x10^3/uL


(0.0-1.1)


 


Eosinophils # (Auto)


  


  0.2x10^3/uL


(0.0-0.7)


 


Basophils # (Auto)


  


  0.1x10^3/uL


(0.0-0.2)


 


Sodium Level


  


  138mmol/L


(136-145)


 


Potassium Level


  


  4.1mmol/L


(3.5-5.1)


 


Chloride Level


  


  100mmol/L


()


 


Carbon Dioxide Level


  


  27mmol/L


(21-32)


 


Anion Gap  11 (6-14) 


 


Blood Urea Nitrogen  22mg/dL (7-20) 


 


Creatinine


  


  1.5mg/dL


(0.6-1.0)


 


Estimated GFR


(Cockcroft-Gault) 


  33.5 


 


 


BUN/Creatinine Ratio  15 (6-20) 


 


Glucose Level


  


  129mg/dL


(70-99)


 


Calcium Level


  


  10.3mg/dL


(8.5-10.1)


 


Total Bilirubin


  


  0.4mg/dL


(0.2-1.0)


 


Aspartate Amino Transf


(AST/SGOT) 


  27U/L (15-37) 


 


 


Alanine Aminotransferase


(ALT/SGPT) 


  31U/L (14-59) 


 


 


Alkaline Phosphatase


  


  168U/L


()


 


Troponin I Quantitative


  


  < 0.017ng/mL


(0.000-0.055)


 


Total Protein


  


  8.1g/dL


(6.4-8.2)


 


Albumin


  


  3.8g/dL


(3.4-5.0)


 


Albumin/Globulin Ratio  0.9 (1.0-1.7) 








Medications





 Current Medications


Sodium Chloride (Iv Sodium Chloride 0.9% 1000ml Bag) 1,000 ml @  1,000 mls/hr 

1X  ONCE IV  Last administered on 1/9/17at 16:50;  Start 1/9/17 at 15:45;  Stop 

1/9/17 at 16:44;  Status DC


Bumetanide (Bumex) 1 mg QSUTUTH PO ;  Start 1/10/17 at 16:00


Bupropion HCl (Wellbutrin Sr) 100 mg DAILY PO  Last administered on 1/10/17at 08

:56;  Start 1/10/17 at 09:00


Cyclopentolate HCl (Cyclogyl) 1 drop DAILY OS  Last administered on 1/10/17at 08

:53;  Start 1/10/17 at 09:00


Docusate Sodium (Colace) 100 mg BID PO  Last administered on 1/10/17at 08:56;  

Start 1/10/17 at 09:00


Famotidine (Pepcid) 20 mg DAILY PO  Last administered on 1/10/17at 08:58;  

Start 1/10/17 at 09:00


Levofloxacin (Levaquin) 500 mg 1X  ONCE PO  Last administered on 1/10/17at 08:55

;  Start 1/10/17 at 06:00;  Stop 1/10/17 at 06:01;  Status DC


Levothyroxine Sodium (Synthroid) 25 mcg DAILY07 PO  Last administered on 1/10/

17at 06:24;  Start 1/10/17 at 07:00


Magnesium Oxide (Magnesium Oxide) 400 mg TIDWMEALS PO  Last administered on 1/10

/17at 12:30;  Start 1/10/17 at 08:00


Polyethylene Glycol (miraLAX PACKET) 17 gm DAILY PO  Last administered on 1/10/

17at 08:55;  Start 1/10/17 at 09:00


Spironolactone (Aldactone) 25 mg DAILY PO  Last administered on 1/10/17at 08:56

;  Start 1/10/17 at 09:00


Levofloxacin (Levaquin) 250 mg DAILY06 PO ;  Start 1/11/17 at 06:00


Aspirin (Renae Aspirin) 325 mg DAILYWBKFT PO  Last administered on 1/10/17at 08:

55;  Start 1/10/17 at 08:00


Atorvastatin Calcium (Lipitor) 10 mg QHS PO ;  Start 1/10/17 at 21:00


Baclofen (Lioresal) 10 mg PRN Q8HRS  PRN PO MUSCLE PAIN;  Start 1/9/17 at 23:15


Ciprofloxacin 1 drop QID OS  Last administered on 1/10/17at 12:30;  Start 1/10/

17 at 09:00


Lidocaine (Lidoderm) 1 patch DAILY TD ;  Start 1/10/17 at 09:00;  Stop 1/10/17 

at 12:06;  Status DC


Nystatin (Nystop) 1 asim TID TP ;  Start 1/10/17 at 09:00;  Stop 1/10/17 at 12:06

;  Status DC


Ondansetron HCl (Zofran) 4 mg PRN Q6HRS  PRN IV NAUSEA/VOMITING;  Start 1/10/17 

at 10:00


Acetaminophen (Tylenol) 500 mg PRN Q6HRS  PRN PO MILD PAIN / TEMP;  Start 1/10/

17 at 10:00


Lidocaine (Lidoderm) 1 patch PRN DAILY  PRN TD PAIN;  Start 1/11/17 at 09:00





Active Scripts


Active


Nystop (Nystatin) 60 Gm Powder 1 Asim TP TID


Mag-Oxide (Magnesium Oxide) 400 Mg Tablet 400 Mg PO TIDWMEALS


Lidoderm (Lidocaine) 700 Mg Adh..patch 1 Patch TD DAILY


Ciprofloxacin Hcl 2.5 Ml Drops 1 Drop OS QID


Baclofen 10 Mg Tablet 10 Mg PO PRN Q8HRS PRN


Atorvastatin Calcium 10 Mg Tablet 10 Mg PO QHS


Aspirin 325 Mg Tablet 325 Mg PO DAILYWBKFT


Reported


Bumetanide 1 Mg Tablet   QSUTUTH


Synthroid (Levothyroxine Sodium) 25 Mcg Tablet 1 Tab PO DAILY


Colace (Docusate Sodium) 100 Mg Capsule 1 Cap PO BID


Levaquin (Levofloxacin) 500 Mg Tablet 1 Tab PO DAILY


Pred-G 1% Eye Drops (Gentamicin/Prednisol Ac) 5 Ml Drops.susp   QID


Cyclopentolate Hcl 2 Ml Drops   


Bupropion Hcl Sr (Bupropion Hcl) 100 Mg Tablet.er   DAILY


Famotidine 20 Mg Tablet 1 Tab PO BID


Miralax (Polyethylene Glycol 3350) 17 Gm Powd.pack 1 Packet PO DAILY


Aldactone (Spironolactone) 25 Mg Tablet 25 Mg PO DAILY


Vitals/I & O





 Vital Sign - Last 24 Hours








 1/9/17 1/9/17 1/9/17 1/9/17





 15:00 16:01 16:54 17:01


 


Pulse 72 74 76 74


 


Resp 16  20 20


 


B/P 12/64 146/66 131/55 143/66


 


Pulse Ox 99   99


 


O2 Delivery Room Air Room Air Room Air Room Air





 1/9/17 1/9/17 1/9/17 1/9/17





 17:31 18:01 19:04 23:00


 


Temp    97.3





    97.3


 


Pulse 74 76 85 77


 


Resp 20 16 16 20


 


B/P 145/63 156/67 138/63 120/71


 


Pulse Ox 97 98 97 96


 


O2 Delivery Room Air Room Air Room Air Room Air


 


    





    





 1/10/17 1/10/17 1/10/17 1/10/17





 03:00 04:05 07:00 11:00


 


Temp 98.0  96.1 96.3





 98.0  96.1 96.3


 


Pulse 81  81 72


 


Resp 20  20 20


 


B/P 112/59  122/59 120/63


 


Pulse Ox 95  98 98


 


O2 Delivery Room Air Room Air Room Air Room Air


 


    





    





 1/10/17 1/10/17 1/10/17 





 13:00 13:05 13:10 


 


Pulse 76 72 83 


 


B/P 112/83 123/59 121/67 














 Intake and Output   


 


 1/9/17 1/9/17 1/10/17





 15:00 23:00 07:00


 


Intake Total  1000 ml 


 


Balance  1000 ml 














MARY BURNETT MD [place] 10, 2017 14:02

## 2017-01-10 NOTE — PDOC2
CARDIAC CONSULT


DATE OF CONSULT


Date of Consult


DATE: 1/10/17 


TIME: 09:51





REASON FOR CONSULT


Reason for Consult:


syncope





REFERRING PHYSICIAN


Referring Physician:


Lorenzo





SOURCE


Source:  Chart review, Patient





HISTORY OF PRESENT ILLNESS


HISTORY OF PRESENT ILLNESS


This is a pleasant 82 yo female admitted for complains of passing out.  Reports 

that she was standing up watching her  in the hospital room being worked 

on by rehab.  She felt anxious at that time and started to feel not right.  She 

sat down and passed out. This was brief but the exact timespan was not noted.  

There was no injury or fall related to this. This occurred at home few days ago 

and this was when she was at the toilet trying to urinate.  The other day she 

was coughing and was slightly nauseated when again she passed out without any 

noted injury.  Denies any immediate dizziness, chest pain, palpitations, visual

, auditory disturbances, facial droop, paresthesia, HA, unilateral weakness to 

extremity or dysarthria.  Denies any seizures in the past. No CAD, VTE.  She is 

not sure about the adequacy of her hydration but she said that she drinks 

enough. Denies taking any antiHTN meds. No recent fevers, vomiting or diarrhea.





PAST MEDICAL HISTORY


Cardiovascular:  CHF, Syncope, Hyperlipidemia, Other (mild carotid artery 

disease;  Mild MV vegetation from bacterial meningitis?)


CENTRAL NERVOUS SYSTEM:  TIA, Other (bacterial meningitis)


GI:  Constipation, GERD


Heme/Onc:  Anemia NOS, Cancer (lymphoma)


Hepatobiliary:  Hep A/B/C (B)


Psych:  Anxiety, Depression, Schizophrenia


Musculoskeletal:   low back pain, Osteoarthritis, Other (cervical stenosis)


Infectious disease:  No pertinent hx


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


Renal/:  Chronic renal insuff, UTI, Urinary Incontinence


Endocrine:  Diabetes (2), Hypothyroidism, Osteopenia


Dermatology:  No pertinent hx





PAST SURGICAL HISTORY


Past Surgical History:  Total knee replacement, Hysterectomy, Other (lumbar 

microdecompression; splenectomy)





FAMILY HISTORY


Family History


noncontributory





SOCIAL HISTORY


Smoke:  No


ALCOHOL:  none


Drugs:  None


Lives:  with Family ( assited living)





CURRENT MEDICATIONS


CURRENT MEDICATIONS





Current Medications








 Medications


  (Trade)  Dose


 Ordered  Sig/Anastasia


 Route


 PRN Reason  Start Time


 Stop Time Status Last Admin


Dose Admin


 


 Sodium Chloride


  (Iv Sodium


 Chloride 0.9%


 1000ml Bag)  1,000 ml @ 


 1,000 mls/hr  1X  ONCE


 IV


   1/9/17 15:45


 1/9/17 16:44 DC 1/9/17 16:50


 


 


 Bupropion HCl


  (Wellbutrin Sr)  100 mg  DAILY


 PO


   1/10/17 09:00


    1/10/17 08:56


 


 


 Cyclopentolate HCl


  (Cyclogyl)  1 drop  DAILY


 OS


   1/10/17 09:00


    1/10/17 08:53


 


 


 Docusate Sodium


  (Colace)  100 mg  BID


 PO


   1/10/17 09:00


    1/10/17 08:56


 


 


 Famotidine


  (Pepcid)  20 mg  DAILY


 PO


   1/10/17 09:00


    1/10/17 08:58


 


 


 Levofloxacin


  (Levaquin)  500 mg  1X  ONCE


 PO


   1/10/17 06:00


 1/10/17 06:01 DC 1/10/17 08:55


 


 


 Levothyroxine


 Sodium


  (Synthroid)  25 mcg  DAILY07


 PO


   1/10/17 07:00


    1/10/17 06:24


 


 


 Magnesium Oxide


  (Magnesium Oxide)  400 mg  TIDWMEALS


 PO


   1/10/17 08:00


    1/10/17 08:55


 


 


 Polyethylene


 Glycol


  (miraLAX PACKET)  17 gm  DAILY


 PO


   1/10/17 09:00


    1/10/17 08:55


 


 


 Spironolactone


  (Aldactone)  25 mg  DAILY


 PO


   1/10/17 09:00


    1/10/17 08:56


 


 


 Aspirin


  (Renae Aspirin)  325 mg  DAILYWBKFT


 PO


   1/10/17 08:00


    1/10/17 08:55


 


 


 Ciprofloxacin  1 drop  QID


 OS


   1/10/17 09:00


    1/10/17 08:57


 











ALLERGIES


ALLERGIES:  


Coded Allergies:  


     Penicillins (Verified  Allergy, Severe, sob, 1/9/17)


     egg (Verified  Allergy, Intermediate, 6/30/16)


     shellfish derived (Verified  Allergy, Intermediate, 6/30/16)





ROS


Review of System


14 point ROS evaluated with pertinent positives noted per HPI





PHYSICAL EXAM


General:  Alert, Oriented X3, Cooperative, No acute distress


HEENT:  Atraumatic, Mucous membr. moist/pink


Lungs:  Clear to auscultation, Normal air movement


Heart:  Regular rate, Normal S1, Normal S2, Other (2/6 systolic murmur to LLS 

border)


Abdomen:  Soft, No tenderness


Extremities:  No cyanosis, No edema


Skin:  No breakdown, No significant lesion


Neuro:  Normal speech, Sensation intact


Psych/Mental Status:  Mental status NL, Mood NL


MUSCULOSKELETAL:  Osteoarthritic changes both hands





VITALS


VITALS





 Vital Signs








  Date Time  Temp Pulse Resp B/P Pulse Ox O2 Delivery O2 Flow Rate FiO2


 


1/10/17 04:05      Room Air  


 


1/10/17 03:00 98.0 81 20 112/59 95   





 98.0       











LABS


Lab:





Laboratory Tests








Test


  1/9/17


16:35 1/9/17


16:50


 


Urine Color Yellow  


 


Urine Clarity Clear  


 


Urine pH 7.0  


 


Urine Specific Gravity 1.010  


 


Urine Protein


  Negativemg/dL


(NEG-TRACE) 


 


 


Urine Glucose (UA)


  Negativemg/dL


(NEG) 


 


 


Urine Ketones (Stick)


  Negativemg/dL


(NEG) 


 


 


Urine Blood Negative (NEG)  


 


Urine Nitrite Negative (NEG)  


 


Urine Bilirubin Negative (NEG)  


 


Urine Urobilinogen Dipstick


  0.2mg/dL (0.2


mg/dL) 


 


 


Urine Leukocyte Esterase Negative (NEG)  


 


Urine RBC 0/HPF (0-2)  


 


Urine WBC Occ/HPF (0-4)  


 


Urine Squamous Epithelial


Cells Occ/LPF 


  


 


 


Urine Bacteria 0/HPF (0-FEW)  


 


Urine Hyaline Casts Moderate/HPF  


 


White Blood Count


  


  8.8x10^3/uL


(4.0-11.0)


 


Red Blood Count


  


  4.96x10^6/uL


(3.50-5.40)


 


Hemoglobin


  


  14.2g/dL


(12.0-15.5)


 


Hematocrit


  


  44.2%


(36.0-47.0)


 


Mean Corpuscular Volume  89fL () 


 


Mean Corpuscular Hemoglobin  29pg (25-35) 


 


Mean Corpuscular Hemoglobin


Concent 


  32g/dL (31-37) 


 


 


Red Cell Distribution Width


  


  15.8%


(11.5-14.5)


 


Platelet Count


  


  407x10^3/uL


(140-400)


 


Neutrophils (%) (Auto)  53% (31-73) 


 


Lymphocytes (%) (Auto)  29% (24-48) 


 


Monocytes (%) (Auto)  15% (0-9) 


 


Eosinophils (%) (Auto)  2% (0-3) 


 


Basophils (%) (Auto)  1% (0-3) 


 


Neutrophils # (Auto)


  


  4.6x10^3uL


(1.8-7.7)


 


Lymphocytes # (Auto)


  


  2.6x10^3/uL


(1.0-4.8)


 


Monocytes # (Auto)


  


  1.3x10^3/uL


(0.0-1.1)


 


Eosinophils # (Auto)


  


  0.2x10^3/uL


(0.0-0.7)


 


Basophils # (Auto)


  


  0.1x10^3/uL


(0.0-0.2)


 


Sodium Level


  


  138mmol/L


(136-145)


 


Potassium Level


  


  4.1mmol/L


(3.5-5.1)


 


Chloride Level


  


  100mmol/L


()


 


Carbon Dioxide Level


  


  27mmol/L


(21-32)


 


Anion Gap  11 (6-14) 


 


Blood Urea Nitrogen  22mg/dL (7-20) 


 


Creatinine


  


  1.5mg/dL


(0.6-1.0)


 


Estimated GFR


(Cockcroft-Gault) 


  33.5 


 


 


BUN/Creatinine Ratio  15 (6-20) 


 


Glucose Level


  


  129mg/dL


(70-99)


 


Calcium Level


  


  10.3mg/dL


(8.5-10.1)


 


Total Bilirubin


  


  0.4mg/dL


(0.2-1.0)


 


Aspartate Amino Transf


(AST/SGOT) 


  27U/L (15-37) 


 


 


Alanine Aminotransferase


(ALT/SGPT) 


  31U/L (14-59) 


 


 


Alkaline Phosphatase


  


  168U/L


()


 


Troponin I Quantitative


  


  < 0.017ng/mL


(0.000-0.055)


 


Total Protein


  


  8.1g/dL


(6.4-8.2)


 


Albumin


  


  3.8g/dL


(3.4-5.0)


 


Albumin/Globulin Ratio  0.9 (1.0-1.7) 











ASSESSMENT/PLAN


ASSESSMENT/PLAN


1. Syncope: BP controlled.  notable for previous episode during her admission 

from [place] last week preceded by post-tussive vomiting as well as prior 

micturition related.  This time associated with anxiety.  Suspect vasovagal/

reflex syncope with associated hydration inadequacy (notable for diuretics). No 

rhythm ectopies overnight. EKG SR with LAFB. TTE today. Maintain po hydration. 

Negative CSH.  Will check for orthostasis.  Mechanical maneuvers discussed to 

abort vasovagal episodes. Compression stockings. Encourage hydration adequacy. 

  Florinef is a consideration if nonmedication treatment fails. Will consider 

for event monitor and will differentiate any associated arrhythmias.  





2. SHAYE on CKD: IVF received.  Defer to PCP





3. Hypothyroidism: TSH level. On replacement. 





4. Anxiety/depression





5. Hx of chronic back pain





6. Hx of TIA and lymphoma (type?)





7. HLP: statin


Problems:  








CIERA BROOKS [place] 10, 2017 10:07

## 2017-01-11 VITALS — DIASTOLIC BLOOD PRESSURE: 70 MMHG | SYSTOLIC BLOOD PRESSURE: 115 MMHG

## 2017-01-11 VITALS — DIASTOLIC BLOOD PRESSURE: 62 MMHG | SYSTOLIC BLOOD PRESSURE: 130 MMHG

## 2017-01-11 VITALS — SYSTOLIC BLOOD PRESSURE: 120 MMHG | DIASTOLIC BLOOD PRESSURE: 53 MMHG

## 2017-01-11 RX ADMIN — ASPIRIN 325 MG ORAL TABLET SCH MG: 325 PILL ORAL at 08:00

## 2017-01-11 RX ADMIN — TOBRAMYCIN AND DEXAMETHASONE SCH DROP: 3; 1 SUSPENSION/ DROPS OPHTHALMIC at 13:00

## 2017-01-11 RX ADMIN — MAGNESIUM OXIDE TAB 400 MG (241.3 MG ELEMENTAL MG) SCH MG: 400 (241.3 MG) TAB at 11:46

## 2017-01-11 RX ADMIN — MAGNESIUM OXIDE TAB 400 MG (241.3 MG ELEMENTAL MG) SCH MG: 400 (241.3 MG) TAB at 08:32

## 2017-01-11 RX ADMIN — SPIRONOLACTONE SCH MG: 25 TABLET ORAL at 08:32

## 2017-01-11 RX ADMIN — LEVOTHYROXINE SODIUM SCH MCG: 25 TABLET ORAL at 05:58

## 2017-01-11 RX ADMIN — TOBRAMYCIN AND DEXAMETHASONE SCH DROP: 3; 1 SUSPENSION/ DROPS OPHTHALMIC at 05:58

## 2017-01-11 RX ADMIN — CYCLOPENTOLATE HYDROCHLORIDE SCH DROP: 10 SOLUTION OPHTHALMIC at 09:00

## 2017-01-11 RX ADMIN — BUPROPION HYDROCHLORIDE SCH MG: 100 TABLET, EXTENDED RELEASE ORAL at 08:32

## 2017-01-11 RX ADMIN — DOCUSATE SODIUM SCH MG: 100 CAPSULE, LIQUID FILLED ORAL at 08:39

## 2017-01-11 RX ADMIN — FAMOTIDINE SCH MG: 20 TABLET, FILM COATED ORAL at 08:32

## 2017-01-11 RX ADMIN — ASPIRIN 325 MG ORAL TABLET SCH MG: 325 PILL ORAL at 08:32

## 2017-01-11 RX ADMIN — POLYETHYLENE GLYCOL 3350 SCH GM: 17 POWDER, FOR SOLUTION ORAL at 08:32

## 2017-01-11 RX ADMIN — CYCLOPENTOLATE HYDROCHLORIDE SCH DROP: 10 SOLUTION OPHTHALMIC at 08:32

## 2017-01-11 NOTE — PDOC3
Discharge Summary


Visit Information


Date of Admission:  Jan 9, 2017


Date of Discharge:  Jan 11, 2017


Admitting Diagnosis Comment:


1. VAso vaga; syncope


2. Anxiety sec to 's current medical condition


3. SHAYE on CKD: 


4. Hypothyroidism: On replacement. 


5. Hx of chronic back pain


6. Hx of TIA and lymphoma 


7. HLP:


8. Hx of meningitis and encephalopathy


Final Diagnosis


 Problems


Medical Problems:


(1) Syncope


Status: Acute  











Brief Hospital Course


Allergies





 Allergies








Coded Allergies Type Severity Reaction Last Updated Verified


 


  Penicillins Allergy Severe sob 1/9/17 Yes


 


  egg Allergy Intermediate  6/30/16 Yes


 


  shellfish derived Allergy Intermediate  6/30/16 Yes








Vital Signs





 Vital Signs








  Date Time  Temp Pulse Resp B/P Pulse Ox O2 Delivery O2 Flow Rate FiO2


 


1/11/17 11:40 97.7 84 14 120/53 96 Room Air  





 97.7       








Lab Results





Laboratory Tests








Test


  1/9/17


16:35 1/9/17


16:50 1/10/17


13:20


 


Urine Color Yellow   


 


Urine Clarity Clear   


 


Urine pH 7.0   


 


Urine Specific Gravity 1.010   


 


Urine Protein


  Negativemg/dL


(NEG-TRACE) 


  


 


 


Urine Glucose (UA)


  Negativemg/dL


(NEG) 


  


 


 


Urine Ketones (Stick)


  Negativemg/dL


(NEG) 


  


 


 


Urine Blood Negative (NEG)   


 


Urine Nitrite Negative (NEG)   


 


Urine Bilirubin Negative (NEG)   


 


Urine Urobilinogen Dipstick


  0.2mg/dL (0.2


mg/dL) 


  


 


 


Urine Leukocyte Esterase Negative (NEG)   


 


Urine RBC 0/HPF (0-2)   


 


Urine WBC Occ/HPF (0-4)   


 


Urine Squamous Epithelial


Cells Occ/LPF 


  


  


 


 


Urine Bacteria 0/HPF (0-FEW)   


 


Urine Hyaline Casts Moderate/HPF   


 


White Blood Count


  


  8.8x10^3/uL


(4.0-11.0) 


 


 


Red Blood Count


  


  4.96x10^6/uL


(3.50-5.40) 


 


 


Hemoglobin


  


  14.2g/dL


(12.0-15.5) 


 


 


Hematocrit


  


  44.2%


(36.0-47.0) 


 


 


Mean Corpuscular Volume  89fL ()  


 


Mean Corpuscular Hemoglobin  29pg (25-35)  


 


Mean Corpuscular Hemoglobin


Concent 


  32g/dL (31-37) 


  


 


 


Red Cell Distribution Width


  


  15.8%


(11.5-14.5) 


 


 


Platelet Count


  


  407x10^3/uL


(140-400) 


 


 


Neutrophils (%) (Auto)  53% (31-73)  


 


Lymphocytes (%) (Auto)  29% (24-48)  


 


Monocytes (%) (Auto)  15% (0-9)  


 


Eosinophils (%) (Auto)  2% (0-3)  


 


Basophils (%) (Auto)  1% (0-3)  


 


Neutrophils # (Auto)


  


  4.6x10^3uL


(1.8-7.7) 


 


 


Lymphocytes # (Auto)


  


  2.6x10^3/uL


(1.0-4.8) 


 


 


Monocytes # (Auto)


  


  1.3x10^3/uL


(0.0-1.1) 


 


 


Eosinophils # (Auto)


  


  0.2x10^3/uL


(0.0-0.7) 


 


 


Basophils # (Auto)


  


  0.1x10^3/uL


(0.0-0.2) 


 


 


Sodium Level


  


  138mmol/L


(136-145) 


 


 


Potassium Level


  


  4.1mmol/L


(3.5-5.1) 


 


 


Chloride Level


  


  100mmol/L


() 


 


 


Carbon Dioxide Level


  


  27mmol/L


(21-32) 


 


 


Anion Gap  11 (6-14)  


 


Blood Urea Nitrogen  22mg/dL (7-20)  


 


Creatinine


  


  1.5mg/dL


(0.6-1.0) 


 


 


Estimated GFR


(Cockcroft-Gault) 


  33.5 


  


 


 


BUN/Creatinine Ratio  15 (6-20)  


 


Glucose Level


  


  129mg/dL


(70-99) 


 


 


Calcium Level


  


  10.3mg/dL


(8.5-10.1) 


 


 


Total Bilirubin


  


  0.4mg/dL


(0.2-1.0) 


 


 


Aspartate Amino Transf


(AST/SGOT) 


  27U/L (15-37) 


  


 


 


Alanine Aminotransferase


(ALT/SGPT) 


  31U/L (14-59) 


  


 


 


Alkaline Phosphatase


  


  168U/L


() 


 


 


Troponin I Quantitative


  


  < 0.017ng/mL


(0.000-0.055) 


 


 


Total Protein


  


  8.1g/dL


(6.4-8.2) 


 


 


Albumin


  


  3.8g/dL


(3.4-5.0) 


 


 


Albumin/Globulin Ratio  0.9 (1.0-1.7)  


 


Magnesium Level


  


  


  2.3mg/dL


(1.8-2.4)


 


Thyroid Stimulating Hormone


(TSH) 


  


  1.762uIU/mL


(0.358-3.74)








Brief Hospital Course


Ms. Nunn  is a 81 old  female was so disappointed with 's 

worsening alzheimers hence had an almost near syncope epsidoe, Echo good EF 60-

65%. CArds arranging for out pt event monitor.


BUt now on day of dc, pt had a fit about home meds and hospital meds that are 

not reconciled, claims had throat swelling and almost like anaphylaxis reaction 

after taking an eye drop and PO levaquin, Urine reviewed, neg, no URI sxs. So i 

stopped PO levaquin. Signif time in room today, 45 mins in room alone, 

verifying all 15 home meds and 20 in pt meds, CAlled pharmacy re substitution 

issues that pt did not like - but also claims snu wont let her take her snu 

meds in hospital (i doubt).I discussed about holding off these eye drops that 

cause her some unwanted reaction and that eye meds wont cause throat swelling. 

In a  nutshell, difficult today, s/p 1 PO benadryl and claims just got sleep 

and dry mouth, I counselled that was a known side effect. Will dc later when 

she thinks she is "ready", otherwise cleared medically





dc time cumulative at least 76 mins





abbey RN at bedside





Pt seen and examined





Discharge Information


Condition at Discharge:  Improved, Stable


Follow Up:  Weeks (cards out pt event monitor)


Disposition/Orders:  D/C to Home


Scheduled


Atorvastatin Calcium (Atorvastatin Calcium) 10 MG PO QHS 


Bumetanide (Bumetanide) QSUTUTH (Reported) 


Bupropion Hcl (Bupropion Hcl Sr) DAILY (Reported) 


Docusate Sodium (Colace) 1 CAP PO BID (Reported) 


Famotidine (Famotidine) 1 TAB PO BID (Reported) 


Gentamicin/Prednisol Ac (Pred-G 1% Eye Drops) QID (Reported) 


Levofloxacin (Levaquin) 1 TAB PO DAILY (Reported) 


Levothyroxine Sodium (Synthroid) 1 TAB PO DAILY (Reported) 


Lidocaine (Lidoderm) 1 PATCH TD DAILY 


Magnesium Oxide (Mag-Oxide) 400 MG PO TIDWMEALS 


Polyethylene Glycol 3350 (Miralax) 1 PACKET PO DAILY (Reported) 


Spironolactone (Aldactone) 25 MG PO DAILY (Reported) 





Scheduled PRN


Aspirin (Aspirin Ec) 325 MG PO PRN DAILY PRN PRN PAIN (Reported) 


Baclofen (Baclofen) 10 MG PO PRN Q8HRS PRN PRN PAIN 





Miscellaneous Medications


Cyclopentolate Hcl (Cyclopentolate Hcl) (Reported) 





Discontinued Medications


Cholecalciferol (Vitamin D3) (Vitamin D-3) 2,000 UNIT PO DAILY (Reported) 


Enoxaparin Sodium (Lovenox) 30 MG SQ DAILY (Reported) 


Ferrous Sulfate (Ferrous Sulfate) 1 TAB PO DAILY (Reported) 


Fluconazole (Diflucan) 1 TAB PO DAILY (Reported) 


Insulin Lispro (Humalog) 100 UNIT SQ TID (Reported) 


Latanoprost (Xalatan) 1 DROP EACHEYE QHS (Reported) 


Meropenem-0.9% Sodium Chloride (Meropenem-0.9% NaCl 500 mg/50) 500 MG IV QID (

Reported) 


Prednisone (Prednisone) 4 TAB PO QID (Reported) 


Tramadol Hcl (Tramadol Hcl) 1 TAB PO DAILY (Reported) 








MARY BURNETT MD Jan 11, 2017 13:53

## 2017-01-11 NOTE — PDOC
CARDIO Progress Notes


Date and Time


Date of Service


1/11/2017


Time of Evaluation


1250





Subjective


Subjective:  No Chest Pain, No shortness of breath, No Palpitations, Other (

upset about substitution)





Vitals


Vitals





 Vital Signs








  Date Time  Temp Pulse Resp B/P Pulse Ox O2 Delivery O2 Flow Rate FiO2


 


1/11/17 11:40 97.7 84 14 120/53 96 Room Air  





 97.7       








Weight


Weight [ ]





Input and Output


Intake and Output











 Intake and Output 


 


 1/11/17





 07:00


 


Intake Total 120 ml


 


Output Total 1 ml


 


Balance 119 ml


 


 


 


Intake Oral 120 ml


 


Output Urine/Stool Mix 1 ml


 


# Voids 8











Laboratory


Labs





Laboratory Tests








Test


  1/10/17


13:20


 


Magnesium Level


  2.3mg/dL


(1.8-2.4)


 


Thyroid Stimulating Hormone


(TSH) 1.762uIU/mL


(0.358-3.74)











Physical Exam


HEENT:  Neck Supple W Full Motion


Chest:  Symmetric


LUNGS:  Clear to Auscultation


Heart:  S1S2, RRR


Abdomen:  Soft N/T


Extremities:  No Calf Tenderness


Neurology:  alert, oriented, follow commands





Assessment


Assessment


1. Syncope: Suspect vasovagal/reflex type with noted volume depletion from 

inadequate po hydration. No rhythm ectopies overnight.. EKG SR with LAFB. TTE 

with normal LV function and EF without significant valvular changes. Maintain 

po hydration. Negative CSH.  No orthostasis.  Mechanical maneuvers discussed to 

abort vasovagal episodes. Compression stockings. Encourage hydration adequacy.  

Florinef is a consideration if nonmedication treatment fails. Will plan for 

outpt event monitor to rule out any associated arrhythmias. 





2. SHAYE on CKD: IVF received.  Defer to PCP





3. Hypothyroidism: TSH normal. On replacement. 





4. Anxiety/depression





5. Hx of chronic back pain





6. Hx of TIA and lymphoma (type?)





7. HLP: statin








CIERA BROOKS Jan 11, 2017 13:09

## 2018-07-03 ENCOUNTER — HOSPITAL ENCOUNTER (OUTPATIENT)
Dept: HOSPITAL 63 - ECHO | Age: 83
Discharge: HOME | End: 2018-07-03
Payer: MEDICARE

## 2018-07-03 DIAGNOSIS — E78.5: ICD-10-CM

## 2018-07-03 DIAGNOSIS — E11.9: ICD-10-CM

## 2018-07-03 DIAGNOSIS — I11.0: ICD-10-CM

## 2018-07-03 DIAGNOSIS — E78.00: ICD-10-CM

## 2018-07-03 DIAGNOSIS — I34.0: Primary | ICD-10-CM

## 2018-07-03 DIAGNOSIS — K21.9: ICD-10-CM

## 2018-07-03 DIAGNOSIS — E03.9: ICD-10-CM

## 2018-07-03 DIAGNOSIS — I50.9: ICD-10-CM

## 2018-07-03 PROCEDURE — 93306 TTE W/DOPPLER COMPLETE: CPT

## 2018-07-03 NOTE — CARD
MR#: [id number]

Account#: [id number]

Accession#: [id number]

Date of Study: 07/03/2018

Ordering Physician: KETURAH RIDLEY, 

Referring Physician: KETURAH RIDLEY, 

Tech: VIKTORIA Mays





--------------- APPROVED REPORT --------------





EXAM: Two-dimensional and M-mode echocardiogram with Doppler and color Doppler.



Other Information 

Quality : Fair



INDICATION

Hx- Mitral Valve Endocarditis



2D DIMENSIONS 

Left Atrium(2D)3.9 (1.6-4.0cm)IVSd0.9 (0.7-1.1cm)

Aortic Root(2D)1.9 (2.0-3.7cm)LVDd4.2 (3.9-5.9cm)

LVOT Diameter1.8 (1.8-2.4cm)PWd1.1 (0.7-1.1cm)

LVDs1.7 (2.5-4.0cm)SV69.1 ml



Aortic Valve

AoV Peak Robi.164.2cm/Félix Peak GR.10.8mmHg

LVOT Peak Robi.102.4cm/sAVA (VMAX)1.65cm2



Mitral Valve

MV E Qbdifiws382.6cm/sMV E Peak Gr.163mmHg

MV DECEL [id number] A Dneniyys925.7cm/s

E/A  Ratio0.9



Tricuspid Valve

TR P. Dspyjtpv052tr/sRAP [id number]

TR Peak Gr.[id number]



 LEFT VENTRICLE 

The left ventricle is normal size. There is normal left ventricular wall thickness. The left ventricu
lar systolic function is normal and the ejection fraction is within normal range. The Ejection Fracti
on is 60-65%. There is normal LV segmental wall motion.



 RIGHT VENTRICLE 

The right ventricle is normal size. There is normal right ventricular wall thickness. The right ventr
icular systolic function is normal.



 ATRIA 

The left atrium size is normal. The right atrium size is normal. The interatrial septum is intact wit
h no evidence for an atrial septal defect or patent foramen ovale as noted on 2-D or Doppler imaging.




 AORTIC VALVE 

The aortic valve is trileaflet. The aortic valve is mildly sclerotic. Doppler and Color Flow revealed
 trace aortic regurgitation. There is no significant aortic valvular stenosis.



 MITRAL VALVE 

The mitral valve is mildly thickened. Mitral annular calcification is mild. Mild rheumatic mitral landon
ve appearance. There is no mitral valve stenosis. Doppler and Color-flow revealed mild to moderate mi
tral regurgitation.



 TRICUSPID VALVE 

Doppler and Color Flow revealed trace tricuspid regurgitation. There is no tricuspid valve stenosis.



 PULMONIC VALVE 

The pulmonic valve is not well visualized. Doppler and Color Flow revealed trace to mild pulmonic landon
vular regurgitation. There is no pulmonic valvular stenosis.



 GREAT VESSELS 

The aortic root is normal in size.



 PERICARDIAL EFFUSION 

There is no pleural effusion. There is no evidence of significant pericardial effusion.



Critical Notification

Critical Value: No



<Conclusion>

The left ventricular systolic function is normal and the ejection fraction is within normal range. Th
e Ejection Fraction is 60-65%.

The aortic valve is trileaflet. The aortic valve is mildly sclerotic.

Doppler and Color-flow revealed mild to moderate mitral regurgitation.

The mitral valve is mildly thickened. Mitral annular calcification is mild. Mild rheumatic mitral landon
ve appearance.



Signed by : Keturah Ridley, 

Electronically Approved : 07/03/2018 13:59:35

## 2018-08-14 ENCOUNTER — HOSPITAL ENCOUNTER (OUTPATIENT)
Dept: HOSPITAL 63 - US | Age: 83
Discharge: HOME | End: 2018-08-14
Payer: MEDICARE

## 2018-08-14 DIAGNOSIS — Z86.2: ICD-10-CM

## 2018-08-14 DIAGNOSIS — E78.5: ICD-10-CM

## 2018-08-14 DIAGNOSIS — I65.22: Primary | ICD-10-CM

## 2018-08-14 DIAGNOSIS — I11.0: ICD-10-CM

## 2018-08-14 DIAGNOSIS — I50.9: ICD-10-CM

## 2018-08-14 DIAGNOSIS — E03.9: ICD-10-CM

## 2018-08-14 DIAGNOSIS — E11.9: ICD-10-CM

## 2018-08-14 DIAGNOSIS — E78.00: ICD-10-CM

## 2018-08-14 DIAGNOSIS — Z86.73: ICD-10-CM

## 2018-08-14 DIAGNOSIS — K21.9: ICD-10-CM

## 2018-08-14 PROCEDURE — 93880 EXTRACRANIAL BILAT STUDY: CPT

## 2018-08-14 NOTE — RAD
MR#: [id number]

Account#: [id number]

Accession#: [id number]

Date of Study: 08/14/2018

Ordering Physician: MIRIAM PRATHER,

Referring Physician: KETURAH RICE,

Tech: Marily Ashraf RDMS, RVT, RTR





--------------- APPROVED REPORT --------------





Patient Location: OUT-PATIENT

Laterality:Bilateral



Indications

Grayscale images of the right carotid bulb reveal moderate soft and mildly calcified plaque which taylor
s not appear to be encroaching the lumen. Spectral waveforms and color Doppler do not reveal any sign
ificant stenosis. A stent velocity criteria overall 0 to less than 50% stenosis.



On the left grayscale images demonstrate mild intimal hyperplasia. No significant velocity accelerati
on or deceleration is noted on spectral imaging.



Bilateral vertebral velocities are antegrade.



Bilateral ICA to CCA ratios are grossly within normal limits.



Critical Notification

Critical Value: No



<Conclusion>

Mild nonobstructive plaque in the left carotid bulb extending into the proximal ICA without any signi
ficant stenosis by velocity criteria.



Signed by : Keturah Rice, 

Electronically Approved : 08/14/2018 12:32:02

## 2019-04-19 ENCOUNTER — HOSPITAL ENCOUNTER (INPATIENT)
Dept: HOSPITAL 61 - SURG | Age: 84
LOS: 2 days | Discharge: HOME | DRG: 418 | End: 2019-04-21
Attending: SURGERY | Admitting: SURGERY
Payer: MEDICARE

## 2019-04-19 VITALS — DIASTOLIC BLOOD PRESSURE: 67 MMHG | SYSTOLIC BLOOD PRESSURE: 143 MMHG

## 2019-04-19 VITALS — DIASTOLIC BLOOD PRESSURE: 61 MMHG | SYSTOLIC BLOOD PRESSURE: 149 MMHG

## 2019-04-19 VITALS — SYSTOLIC BLOOD PRESSURE: 133 MMHG | DIASTOLIC BLOOD PRESSURE: 55 MMHG

## 2019-04-19 VITALS — DIASTOLIC BLOOD PRESSURE: 48 MMHG | SYSTOLIC BLOOD PRESSURE: 123 MMHG

## 2019-04-19 VITALS — SYSTOLIC BLOOD PRESSURE: 141 MMHG | DIASTOLIC BLOOD PRESSURE: 55 MMHG

## 2019-04-19 VITALS — SYSTOLIC BLOOD PRESSURE: 142 MMHG | DIASTOLIC BLOOD PRESSURE: 66 MMHG

## 2019-04-19 VITALS — SYSTOLIC BLOOD PRESSURE: 148 MMHG | DIASTOLIC BLOOD PRESSURE: 66 MMHG

## 2019-04-19 VITALS — DIASTOLIC BLOOD PRESSURE: 52 MMHG | SYSTOLIC BLOOD PRESSURE: 150 MMHG

## 2019-04-19 VITALS — BODY MASS INDEX: 19.47 KG/M2 | HEIGHT: 70 IN | WEIGHT: 136 LBS

## 2019-04-19 VITALS — SYSTOLIC BLOOD PRESSURE: 147 MMHG | DIASTOLIC BLOOD PRESSURE: 60 MMHG

## 2019-04-19 DIAGNOSIS — Z95.1: ICD-10-CM

## 2019-04-19 DIAGNOSIS — E11.51: ICD-10-CM

## 2019-04-19 DIAGNOSIS — M85.80: ICD-10-CM

## 2019-04-19 DIAGNOSIS — Z83.3: ICD-10-CM

## 2019-04-19 DIAGNOSIS — I50.32: ICD-10-CM

## 2019-04-19 DIAGNOSIS — Z88.0: ICD-10-CM

## 2019-04-19 DIAGNOSIS — I95.9: ICD-10-CM

## 2019-04-19 DIAGNOSIS — F41.8: ICD-10-CM

## 2019-04-19 DIAGNOSIS — K59.00: ICD-10-CM

## 2019-04-19 DIAGNOSIS — D64.9: ICD-10-CM

## 2019-04-19 DIAGNOSIS — H54.62: ICD-10-CM

## 2019-04-19 DIAGNOSIS — Z90.81: ICD-10-CM

## 2019-04-19 DIAGNOSIS — Z82.49: ICD-10-CM

## 2019-04-19 DIAGNOSIS — G89.29: ICD-10-CM

## 2019-04-19 DIAGNOSIS — E11.22: ICD-10-CM

## 2019-04-19 DIAGNOSIS — Z90.710: ICD-10-CM

## 2019-04-19 DIAGNOSIS — Z96.659: ICD-10-CM

## 2019-04-19 DIAGNOSIS — F17.210: ICD-10-CM

## 2019-04-19 DIAGNOSIS — Z86.61: ICD-10-CM

## 2019-04-19 DIAGNOSIS — I34.0: ICD-10-CM

## 2019-04-19 DIAGNOSIS — Z91.012: ICD-10-CM

## 2019-04-19 DIAGNOSIS — Z86.73: ICD-10-CM

## 2019-04-19 DIAGNOSIS — Z91.013: ICD-10-CM

## 2019-04-19 DIAGNOSIS — K80.10: Primary | ICD-10-CM

## 2019-04-19 DIAGNOSIS — E78.5: ICD-10-CM

## 2019-04-19 DIAGNOSIS — N18.9: ICD-10-CM

## 2019-04-19 DIAGNOSIS — M19.90: ICD-10-CM

## 2019-04-19 DIAGNOSIS — K21.9: ICD-10-CM

## 2019-04-19 DIAGNOSIS — Z85.72: ICD-10-CM

## 2019-04-19 DIAGNOSIS — F20.9: ICD-10-CM

## 2019-04-19 DIAGNOSIS — I70.0: ICD-10-CM

## 2019-04-19 DIAGNOSIS — R55: ICD-10-CM

## 2019-04-19 DIAGNOSIS — Z95.0: ICD-10-CM

## 2019-04-19 DIAGNOSIS — E03.4: ICD-10-CM

## 2019-04-19 PROCEDURE — 80053 COMPREHEN METABOLIC PANEL: CPT

## 2019-04-19 PROCEDURE — G0238 OTH RESP PROC, INDIV: HCPCS

## 2019-04-19 PROCEDURE — 88304 TISSUE EXAM BY PATHOLOGIST: CPT

## 2019-04-19 PROCEDURE — 85025 COMPLETE CBC W/AUTO DIFF WBC: CPT

## 2019-04-19 PROCEDURE — 36415 COLL VENOUS BLD VENIPUNCTURE: CPT

## 2019-04-19 PROCEDURE — A7015 AEROSOL MASK USED W NEBULIZE: HCPCS

## 2019-04-19 PROCEDURE — 80048 BASIC METABOLIC PNL TOTAL CA: CPT

## 2019-04-19 PROCEDURE — 83735 ASSAY OF MAGNESIUM: CPT

## 2019-04-19 PROCEDURE — 70450 CT HEAD/BRAIN W/O DYE: CPT

## 2019-04-19 PROCEDURE — 82962 GLUCOSE BLOOD TEST: CPT

## 2019-04-19 PROCEDURE — 0FT44ZZ RESECTION OF GALLBLADDER, PERCUTANEOUS ENDOSCOPIC APPROACH: ICD-10-PCS | Performed by: SURGERY

## 2019-04-19 RX ADMIN — DEXTROSE, SODIUM CHLORIDE, SODIUM LACTATE, POTASSIUM CHLORIDE, AND CALCIUM CHLORIDE SCH MLS/HR: 5; .6; .31; .03; .02 INJECTION, SOLUTION INTRAVENOUS at 22:06

## 2019-04-19 RX ADMIN — DEXTROSE, SODIUM CHLORIDE, SODIUM LACTATE, POTASSIUM CHLORIDE, AND CALCIUM CHLORIDE SCH MLS/HR: 5; .6; .31; .03; .02 INJECTION, SOLUTION INTRAVENOUS at 19:41

## 2019-04-19 RX ADMIN — FENTANYL CITRATE PRN MCG: 50 INJECTION INTRAMUSCULAR; INTRAVENOUS at 09:14

## 2019-04-19 RX ADMIN — FENTANYL CITRATE PRN MCG: 50 INJECTION INTRAMUSCULAR; INTRAVENOUS at 09:26

## 2019-04-19 NOTE — PDOC4
Operative Note


Operative Note


Date: 4/19/2019


Preoperative diagnosis: Chronic cholecystitis


Postoperative diagnosis: Same


Procedure: Laparoscopic cholecystectomy


Specimen: Gallbladder


Surgeon: Osorio


Patient: Patient is 83-year-old female with right upper quadrant abdominal pain 

and ultrasound showing gallstones procedure of laparoscopic cholecystectomy was 

explained to the patient detail risk benefits were also discussed including 

bleeding infection injury to intra-abdominal contents possibly necessitating 

further or open operations alternatives to this procedure also discussed with 

patient who seemed to understand and gave both verbal and written consent had 

procedure performed. Patient was taken to the operating room placed in supine 

position general anesthesia was initiated once patient was sleep and intubated 

her abdomen was prepped and draped in the usual sterile fashion using 

ChloraPrep. An area just below the umbilicus was injected with quarter percent 

Marcaine with epinephrine incision was made 11 blade scalpel varies needle was 

placed within the abdomen creating pneumoperitoneum once this was complete 11 

mm port was placed and a 5 mm camera was placed within the abdomen which was 

inspected no other abnormalities were noted. A 5 mm port was placed in the 

epigastrium, 5 mm port was placed in the right lateral abdomen and one in the 

right mid abdomen the dome of the gallbladder is grasped and retracted cephalad 

the infundibulum of the gallbladder is grasped and retracted laterally exposing 

the triangle adherent tissues of the triangle were taken down with blunt and 

sharp dissection exposing the cystic duct and cystic artery both were doubly 

clipped and transected the gallbladder was taken off the liver with hook 

electrocautery placed within Endo Catch bag and removed from the umbilicus. 

Right upper quadrant was irrigated and suctioned dry hemostasis was deemed to 

be appropriate and the pneumoperitoneum was reduced all ports removed the 

fascial defect at the umbilicus was closed with figure-of-eight 0 Vicryl suture 

and the skin was reapproximated all port sites with 40 septic and a Monocryl 

Mastisol Steri-Strips and island dressings were applied. Patient was awakened 

and excavated in the operating room taken to recovery in stable condition all 

sponge instrument needle counts listed as correct estimated blood loss 5 mL











KIT BARNES MD Apr 19, 2019 08:46

## 2019-04-19 NOTE — NUR
Pt was admitted from PACU at approximately 1115 today.  Client was A & O X4, VSS, some 
discomfort around lap sites, and able to verbalize when to use call light.  Client was 
provided with water and ice chips.  Lap sites were clean, dry, and intact.  Call light 
within reach.  Will continue to monitor.

## 2019-04-20 VITALS — DIASTOLIC BLOOD PRESSURE: 61 MMHG | SYSTOLIC BLOOD PRESSURE: 134 MMHG

## 2019-04-20 VITALS — DIASTOLIC BLOOD PRESSURE: 61 MMHG | SYSTOLIC BLOOD PRESSURE: 129 MMHG

## 2019-04-20 VITALS — SYSTOLIC BLOOD PRESSURE: 58 MMHG | DIASTOLIC BLOOD PRESSURE: 25 MMHG

## 2019-04-20 VITALS — SYSTOLIC BLOOD PRESSURE: 121 MMHG | DIASTOLIC BLOOD PRESSURE: 54 MMHG

## 2019-04-20 VITALS — DIASTOLIC BLOOD PRESSURE: 40 MMHG | SYSTOLIC BLOOD PRESSURE: 102 MMHG

## 2019-04-20 VITALS — DIASTOLIC BLOOD PRESSURE: 56 MMHG | SYSTOLIC BLOOD PRESSURE: 111 MMHG

## 2019-04-20 VITALS — DIASTOLIC BLOOD PRESSURE: 60 MMHG | SYSTOLIC BLOOD PRESSURE: 129 MMHG

## 2019-04-20 VITALS — SYSTOLIC BLOOD PRESSURE: 58 MMHG | DIASTOLIC BLOOD PRESSURE: 30 MMHG

## 2019-04-20 VITALS — SYSTOLIC BLOOD PRESSURE: 120 MMHG | DIASTOLIC BLOOD PRESSURE: 50 MMHG

## 2019-04-20 LAB
ALBUMIN SERPL-MCNC: 3.6 G/DL (ref 3.4–5)
ALBUMIN/GLOB SERPL: 1.1 {RATIO} (ref 1–1.7)
ALP SERPL-CCNC: 104 U/L (ref 46–116)
ALT SERPL-CCNC: 29 U/L (ref 14–59)
ANION GAP SERPL CALC-SCNC: 8 MMOL/L (ref 6–14)
AST SERPL-CCNC: 38 U/L (ref 15–37)
BASOPHILS # BLD AUTO: 0.1 X10^3/UL (ref 0–0.2)
BASOPHILS NFR BLD: 1 % (ref 0–3)
BILIRUB SERPL-MCNC: 0.6 MG/DL (ref 0.2–1)
BUN SERPL-MCNC: 14 MG/DL (ref 7–20)
BUN/CREAT SERPL: 10 (ref 6–20)
CALCIUM SERPL-MCNC: 9.7 MG/DL (ref 8.5–10.1)
CHLORIDE SERPL-SCNC: 101 MMOL/L (ref 98–107)
CO2 SERPL-SCNC: 28 MMOL/L (ref 21–32)
CREAT SERPL-MCNC: 1.4 MG/DL (ref 0.6–1)
EOSINOPHIL NFR BLD: 0 % (ref 0–3)
EOSINOPHIL NFR BLD: 0 X10^3/UL (ref 0–0.7)
ERYTHROCYTE [DISTWIDTH] IN BLOOD BY AUTOMATED COUNT: 15.2 % (ref 11.5–14.5)
GFR SERPLBLD BASED ON 1.73 SQ M-ARVRAT: 36.1 ML/MIN
GLOBULIN SER-MCNC: 3.3 G/DL (ref 2.2–3.8)
GLUCOSE SERPL-MCNC: 143 MG/DL (ref 70–99)
HCT VFR BLD CALC: 37 % (ref 36–47)
HGB BLD-MCNC: 11.6 G/DL (ref 12–15.5)
LYMPHOCYTES # BLD: 1.6 X10^3/UL (ref 1–4.8)
LYMPHOCYTES NFR BLD AUTO: 12 % (ref 24–48)
MAGNESIUM SERPL-MCNC: 1.6 MG/DL (ref 1.8–2.4)
MCH RBC QN AUTO: 30 PG (ref 25–35)
MCHC RBC AUTO-ENTMCNC: 31 G/DL (ref 31–37)
MCV RBC AUTO: 94 FL (ref 79–100)
MONO #: 1.7 X10^3/UL (ref 0–1.1)
MONOCYTES NFR BLD: 13 % (ref 0–9)
NEUT #: 10 X10^3UL (ref 1.8–7.7)
NEUTROPHILS NFR BLD AUTO: 75 % (ref 31–73)
PLATELET # BLD AUTO: 236 X10^3/UL (ref 140–400)
POTASSIUM SERPL-SCNC: 4.8 MMOL/L (ref 3.5–5.1)
PROT SERPL-MCNC: 6.9 G/DL (ref 6.4–8.2)
RBC # BLD AUTO: 3.95 X10^6/UL (ref 3.5–5.4)
SODIUM SERPL-SCNC: 137 MMOL/L (ref 136–145)
WBC # BLD AUTO: 13.4 X10^3/UL (ref 4–11)

## 2019-04-20 RX ADMIN — Medication SCH MG: at 16:35

## 2019-04-20 RX ADMIN — DOCUSATE SODIUM SCH MG: 100 CAPSULE, LIQUID FILLED ORAL at 16:35

## 2019-04-20 RX ADMIN — HEPARIN SODIUM SCH UNIT: 5000 INJECTION, SOLUTION INTRAVENOUS; SUBCUTANEOUS at 21:18

## 2019-04-20 RX ADMIN — HEPARIN SODIUM SCH UNIT: 5000 INJECTION, SOLUTION INTRAVENOUS; SUBCUTANEOUS at 16:39

## 2019-04-20 RX ADMIN — OXYCODONE HYDROCHLORIDE AND ACETAMINOPHEN SCH MG: 500 TABLET ORAL at 16:00

## 2019-04-20 RX ADMIN — DOCUSATE SODIUM SCH MG: 100 CAPSULE, LIQUID FILLED ORAL at 21:14

## 2019-04-20 RX ADMIN — CHOLECALCIFEROL CAP 125 MCG (5000 UNIT) SCH UNIT: 125 CAP at 16:00

## 2019-04-20 RX ADMIN — DEXTROSE, SODIUM CHLORIDE, SODIUM LACTATE, POTASSIUM CHLORIDE, AND CALCIUM CHLORIDE SCH MLS/HR: 5; .6; .31; .03; .02 INJECTION, SOLUTION INTRAVENOUS at 10:01

## 2019-04-20 RX ADMIN — INSULIN LISPRO SCH UNITS: 100 INJECTION, SOLUTION INTRAVENOUS; SUBCUTANEOUS at 17:00

## 2019-04-20 RX ADMIN — BUPROPION HYDROCHLORIDE SCH MG: 100 TABLET, FILM COATED, EXTENDED RELEASE ORAL at 16:00

## 2019-04-20 RX ADMIN — CYANOCOBALAMIN TAB 1000 MCG SCH MCG: 1000 TAB at 16:00

## 2019-04-20 NOTE — NUR
approximately 1230 pt's family member ran to the desk and asked for help. She said that the 
pt was shaking and something was "not right". I ran into the room and found pt sitting in a 
chair staring blankly ahead. The right side of her face was twitching, although family 
member stated that initially her right shoulder was twitching as well. I called her name and 
touched her shoulder, she did not respond. I did notice a definite difference in her eyes, 
but later learned that pt is blind in her left eye and that such a difference is baseline 
for her. After about 20 seconds, pt was able to begin speaking clearly, not slurred, but 
words were disconnected. Within another minute pt was able to respond to a NIH scale and 
found that pt was equally sensory and responsive on both sides. The asymetry of her face is 
baseline for her. I did call out for VS immediately and code stroke. BP was markedly 
decreased to 50s over 30s. Pt just stated that she felt "strange". Primary RN arrived in the 
room as well as house supervisor and code stroke personnel.

## 2019-04-20 NOTE — PDOC2
NEUROLOGY CONSULT


Date of Admission


Date of Admission


DATE: 4/20/19 


TIME: 16:55





Reason for Consult


Reason for Consult:


IMPRESSION:


Syncope.


Hypotension, BP 58/25 mmHg.


Seizure not likely.


Abdominal pain. 


Cholecystitis s/p cholecystectomy.


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


Valvular vegetations.


Diffuse atheromatous aortic calcification.


Mental cell lymphoma.


S/p splenectomy.


HLD





RECOMMENDATIONS/PLAN:


Treat medical and surgical diseases.


EEG as out-patient, but she declined it.


FU with PCP.


FU with Neurology if has seizure like episodes.





History of Present Illness


Ms Nunn is an 83-y-o  female patient with PMHx CHF, Hyperlipidemia

, carotid artery disease, TIA, constipation, GERD, anemia, h/o mantle cell 

Lymphoma, Hep B, anxiety, depression, chronic LBP, DM2, hypothyroidism, CKD, 

left eye blindness, osteopenia who was admitted for elective lap 

cholecystectomy from a home independent living facility where she lives with 

her . Today a code stroke was called when she was not responsive, found 

with blood pressure 50/25 at noon today, went for stat CT head, No acute 

intracranial process detected. She notes prior to the episode she saw stars 

after a PT session, which resolved on its own. She did not have LOC, shaking or 

nelia movements. No postictal state. She was responsive to a fluid bolus, 

has a BP of 111/56 mmHg.





Past Medical History


Cardiovascular:  CHF, Syncope, Hyperlipidemia, Other


Pulmonary:  No pertinent hx


CENTRAL NERVOUS SYSTEM:  TIA, Other


GI:  Constipation, GERD


Heme/Onc:  Anemia NOS, Cancer


Hepatobiliary:  Hep A/B/C


Psych:  Anxiety, Depression, Schizophrenia


Musculoskeletal:   low back pain, Osteoarthritis, Other


Infectious disease:  No pertinent hx


Renal/:  Chronic renal insuff, UTI, Urinary Incontinence


Endocrine:  Diabetes, Hypothyroidism, Osteopenia





Past Surgical History


Cholecystectomy (4/19/19 - Dr. cA), Total knee replacement, Hysterectomy, 

Other





Family History


No Significant





Social History


NoALCOHOL:  none


Drugs:  None


Lives:  with Family ( in independent adult living)





Allergies


Coded Allergies:  


Penicillins (Verified  Allergy, Severe, sob, 4/19/19). shellfish derived (

Verified  Allergy, Severe, SHORT OF BREATH, 4/19/19), egg (Verified  Allergy, 

Mild, NAUSEA/VOMITING, 4/19/19)





MEDICATIONS:


Refer to MAR





REVIEW OF SYSTEMS:


Constitutional: No malnutrition, weight loss


Head: No traumatic brain or head injury.


Skin: No edema, or rash.


Ear: No infection, tinnitus.


Eyes: No vision loss, color blindness


Nose: No bleeding or purulent discharges.


Neck: No injury, lymph note enlargement


Breast: No history of cancer, masses, discharges.


Cardiac: No MI, arrhythmia


Pulmonary: No hemoptysis, dyspnea


GI: No melena, hematochezia


Urinary/genital: UTI.


Endocrine: No symptoms of gigantism, dwarf, hyperphasia, cousin face, 

craniofacial dysmorphism, polydactyly, goiter


Skeletomuscular: No muscular atrophy, deformity


Neurological: see  HP.


Psychiatric: Denies drug use/abuse.


Otherwise, not pertinant14-point review of systems.





PHYSICAL EXAMINATION:   


General appearance in no acute distress.  


HEENT:  Normocephalic and nontraumatic.  Eyes, nose, ears, and throat are 

unremarkable.  


Neck is supple. No lymphadenopathy. No bruits are heard over the carotid 

artery.  


Cardiovascular:  S1, S2, regular rate and rhythm.  


Pulmonary:  Clear to auscultation bilaterally. 


Abdomen:  Bowel sounds are positive.   


Extremities:  No rash, lesions, or edema.  


No restriction of range of motion





NEUROLOGICAL  EXAMINATION:


Awake.


Oriented to time, place and person.


PERRL.


EOMI.


CN: no focal findings.


Muscle tone: within normal.


Muscle strength: 5-


DTR: 2 at UE, 1 at knee s/p knee surgery.


Plantar reflex: flexor response bilaterally 


Gait: Able to walk.


Sensory exam: No abnormal findings.


No cerebellar signs elicited.











Past Medical History


Cardiovascular:  CHF, Syncope, Hyperlipidemia, Other


Pulmonary:  No pertinent hx


CENTRAL NERVOUS SYSTEM:  TIA, Other


GI:  Constipation, GERD


Heme/Onc:  Anemia NOS, Cancer


Hepatobiliary:  Hep A/B/C


Psych:  Anxiety, Depression, Schizophrenia


Musculoskeletal:   low back pain, Osteoarthritis, Other


Infectious disease:  No pertinent hx


Renal/:  Chronic renal insuff, UTI, Urinary Incontinence


Endocrine:  Diabetes, Hypothyroidism, Osteopenia





Past Surgical History


Past Surgical History:  Cholecystectomy (4/19/19 - Dr. Ac), Total knee 

replacement, Hysterectomy, Other





Family History


Family History:  No Significant





Social History


NoALCOHOL:  none


Drugs:  None


Lives:  with Family ( in independent adult living)





Allergies


Coded Allergies:  


     Penicillins (Verified  Allergy, Severe, sob, 4/19/19)


     shellfish derived (Verified  Allergy, Severe, SHORT OF BREATH, 4/19/19)


     egg (Verified  Allergy, Mild, NAUSEA/VOMITING, 4/19/19)








HISTORY OF THE PRESENT ILLNESS:





PAST MEDICAL HISTORY:


Please see above.





PAST SURGERY HISTORY: Pacemaker Placement, S/P CABG, Tonsillectomy, Appendectomy

, Cholecystectomy, Hysterectomy, Hernia Repair, Neck, Shoulder, Knee surgery, 

No major surgery recently.





ALLERGY:


NKDA


Unknown





MEDICATIONS:


Refer to MAR





FAMILY HISTORY: HTN, HLD, DM, CAD, PD, Dementia, Non contributory.





SOCIAL HISTORY: Lives alone. Lives in nursing home.  Denies smoking, drinking, 

and illicit drug use.  He  She smokes   pack of cigarettes a day for   years.  

He  She drinks OZ alcohol a day for    years.





REVIEW OF SYSTEMS:


Constitutional: No malnutrition, weight loss, cachexia.


Head: No traumatic brain or head injury.


Skin: No edema, or rash.


Ear: No infection, tinnitus.


Eyes: No vision loss or color blindness.


Nose: No bleeding or purulent discharges.


Hearing: No hearing decrease.


Neck: No injury.


Breast: No history of cancer, masses,or discharges.


Cardiac: No MI, arrhythmia,claudication, CAD, s/p CABG, AFib, Pacemaker 

Placement, HTN, HLD.


Pulmonary: No pneumonia, COPD.


GI: No GI ulcer, GI bleeding, GERD.


Urinary/genital: No dysuria, hematuria, incontinence, urinary retention, UTI.


Endocrinologic: No cousin face, craniofacial dysmorphism, polydactyly, goiter,

Diabetes Mellitus, hypothyroidism, obesity, morbid obesity.


Skeletomuscular: No muscular atrophy, deformity, Generalized weakness.


Neurological: see  HP.


Psychiatric: Denies drug use/abuse.


Otherwise, not pertinant14-point review of systems.





PHYSICAL EXAMINATION:   


General appearance is in no acute distress.  


HEENT:  Normocephalic and nontraumatic.  Eyes, nose, ears, and throat are 

unremarkable.  


Neck is supple. No lymphadenopathy. No bruits are heard over the carotid 

artery.  No crepitus. 


Cardiovascular:  S1, S2, regular rate and rhythm.  


Pulmonary:  Clear to auscultation bilaterally. 


Abdomen:  Bowel sounds are positive.  Abdomen is soft, nontender, and 

nondistended.    


Extremities:  No rash, lesions, or edema.  


No restriction of range of motion





NEUROLOGICAL  EXAMINATION:


Alert 


Oriented to time, place and person.


PERRL.


EOMI.


CN: no focal findings.


Muscle tone: within normal.


Muscle strength: 5


DTR: 2


Plantar reflex: Flexor/Neutral response bilaterally 


Gait: not examined in bed. At baseline normal.


Sensory exam: no abnormal findings.


No cerebellar signs elicited.


F-T-N test accurate.





Current Medications


Current Medications





Current Medications


Ondansetron HCl (Zofran) 4 mg PRN Q6HRS  PRN IV NAUSEA/VOMITING;  Start 4/19/19 

at 07:00;  Stop 4/20/19 at 06:59;  Status DC


Fentanyl Citrate (Fentanyl 2ml Vial) 25 mcg PRN Q5MIN  PRN IV MILD PAIN;  Start 

4/19/19 at 07:00;  Stop 4/20/19 at 06:59;  Status DC


Fentanyl Citrate (Fentanyl 2ml Vial) 50 mcg PRN Q5MIN  PRN IV MODERATE TO 

SEVERE PAIN Last administered on 4/19/19at 09:26;  Start 4/19/19 at 07:00;  

Stop 4/20/19 at 06:59;  Status DC


Morphine Sulfate (Morphine Sulfate) 1 mg PRN Q10MIN  PRN IV SEVERE PAIN;  Start 

4/19/19 at 07:00;  Stop 4/20/19 at 06:59;  Status DC


Ringer's Solution 1,000 ml @  30 mls/hr Q24H IV  Last administered on 4/19/19at 

07:16;  Start 4/19/19 at 07:00;  Stop 4/19/19 at 18:59;  Status DC


Hydromorphone HCl (Dilaudid) 0.5 mg PRN Q10MIN  PRN IV SEV PAIN, Second choice;

  Start 4/19/19 at 07:00;  Stop 4/20/19 at 06:59;  Status DC


Prochlorperazine Edisylate (Compazine) 5 mg PACU PRN  PRN IV NAUSEA, MRX1;  

Start 4/19/19 at 07:00;  Stop 4/20/19 at 06:59;  Status DC


Levofloxacin/ Dextrose 100 ml @  100 mls/hr 1X PREOP  PRN IV PRIOR TO PROCEDURE 

Last administered on 4/19/19at 07:49;  Start 4/19/19 at 06:00;  Stop 4/19/19 at 

18:00;  Status DC


Bupivacaine HCl/ Epinephrine Bitart (Sensorcain-Mpf Epi 0.5%-1:115954) 30 ml STK

-MED ONCE .ROUTE  Last administered on 4/19/19at 08:15;  Start 4/19/19 at 05:52

;  Stop 4/19/19 at 06:52;  Status DC


Iohexol (Omnipaque 300 Mg/ml) 100 ml STK-MED ONCE .ROUTE ;  Start 4/19/19 at 05:

52;  Stop 4/19/19 at 06:52;  Status DC


Cellulose (Surgicel Hemostat 4x8) 1 each STK-MED ONCE .ROUTE ;  Start 4/19/19 

at 05:52;  Stop 4/19/19 at 06:53;  Status DC


Propofol 20 ml @ As Directed STK-MED ONCE IV ;  Start 4/19/19 at 07:35;  Stop 4/ 19/19 at 07:36;  Status DC


Dexamethasone Sodium Phosphate (Decadron) 20 mg STK-MED ONCE .ROUTE ;  Start 4/ 19/19 at 07:35;  Stop 4/19/19 at 07:36;  Status DC


Lidocaine HCl (Lidocaine Pf 2% Vial) 5 ml STK-MED ONCE .ROUTE ;  Start 4/19/19 

at 07:35;  Stop 4/19/19 at 07:36;  Status DC


Ondansetron HCl (Zofran) 4 mg STK-MED ONCE .ROUTE ;  Start 4/19/19 at 07:35;  

Stop 4/19/19 at 07:36;  Status DC


Rocuronium Bromide (Zemuron) 50 mg STK-MED ONCE .ROUTE ;  Start 4/19/19 at 07:36

;  Stop 4/19/19 at 07:37;  Status DC


Fentanyl Citrate (Fentanyl 2ml Vial) 100 mcg STK-MED ONCE .ROUTE ;  Start 4/19/ 19 at 07:36;  Stop 4/19/19 at 07:37;  Status DC


Glycopyrrolate (Robinul) 1 mg STK-MED ONCE .ROUTE ;  Start 4/19/19 at 08:23;  

Stop 4/19/19 at 08:24;  Status DC


Phenylephrine HCl (PHENYLEPHRINE in 0.9% NACL PF) 1 mg STK-MED ONCE IV ;  Start 

4/19/19 at 08:24;  Stop 4/19/19 at 08:25;  Status DC


Neostigmine Methylsulfate (Neostigmine Methylsulfate) 5 mg STK-MED ONCE .ROUTE 

;  Start 4/19/19 at 08:36;  Stop 4/19/19 at 08:37;  Status DC


Sodium Chloride (Normal Saline Flush) 3 ml QSHIFT  PRN IV AFTER MEDS AND BLOOD 

DRAWS;  Start 4/19/19 at 09:00


Morphine Sulfate (Morphine Sulfate) 2 mg PRN Q3HRS  PRN IV PAIN;  Start 4/19/19 

at 09:00


Oxycodone/ Acetaminophen (Percocet 5/325) 1 tab PRN Q4HRS  PRN PO MILD PAIN, 

1ST CHOICE;  Start 4/19/19 at 09:00


Oxycodone/ Acetaminophen (Percocet 5/325) 2 tab PRN Q4HRS  PRN PO MODERATE PAIN

, SEVERE PAIN;  Start 4/19/19 at 09:00


Ondansetron HCl (Zofran) 4 mg PRN Q6HRS  PRN IV NAUSEA, 1ST CHOICE Last 

administered on 4/19/19at 10:29;  Start 4/19/19 at 09:00


Dextrose/Lactated Ringer's 1,000 ml @  75 mls/hr [id number] IV  Last administered 

on 4/19/19at 19:41;  Start 4/19/19 at 08:46


Sevoflurane (Ultane) 30 ml STK-MED ONCE IH ;  Start 4/19/19 at 08:51;  Stop 4/19 /19 at 08:52;  Status DC


Fentanyl Citrate (Fentanyl 2ml Vial) 100 mcg STK-MED ONCE .ROUTE ;  Start 4/19/ 19 at 09:06;  Stop 4/19/19 at 09:07;  Status DC


Prochlorperazine Edisylate (Compazine) 10 mg 1X  ONCE IV  Last administered on 4 /19/19at 12:45;  Start 4/19/19 at 12:45;  Stop 4/19/19 at 12:46;  Status DC


Magnesium Sulfate 50 ml @ 25 mls/hr 1X  ONCE IV  Last administered on 4/20/19at 

16:34;  Start 4/20/19 at 14:30;  Stop 4/20/19 at 16:29;  Status DC


Sodium Chloride 1,000 ml @  125 mls/hr Q8H IV  Last administered on 4/20/19at 16

:36;  Start 4/20/19 at 14:15


Polyethylene Glycol (miraLAX PACKET) 17 gm BID  PRN PO CONSTIPATION;  Start 4/20 /19 at 14:45


Docusate Sodium (Colace) 100 mg BID PO  Last administered on 4/20/19at 16:35;  

Start 4/20/19 at 14:45


Magnesium Hydroxide (Milk Of Magnesia) 2,400 mg 1X  ONCE PO ;  Start 4/20/19 at 

15:30;  Stop 4/20/19 at 15:31;  Status DC


Ascorbic Acid (Vitamin C) 500 mg DAILY PO ;  Start 4/20/19 at 16:00


Aspirin (Ecotrin) 325 mg PRN DAILY  PRN PO PAIN;  Start 4/20/19 at 15:15


Bupropion HCl (Wellbutrin Sr) 100 mg DAILY PO ;  Start 4/20/19 at 16:00


Cyanocobalamin (Vitamin B-12) 1,000 mcg DAILY PO ;  Start 4/20/19 at 16:00


Ferrous Sulfate (Feosol) 325 mg BIDWMEALS PO  Last administered on 4/20/19at 16:

35;  Start 4/20/19 at 17:00


Vitamin D (Vitamin D3) 5,000 unit DAILY PO ;  Start 4/20/19 at 16:00


Levothyroxine Sodium (Synthroid) 25 mcg DAILY06 PO ;  Start 4/21/19 at 06:00


Pantoprazole Sodium (Protonix) 40 mg DAILYAC PO ;  Start 4/21/19 at 07:30


Insulin Human Lispro (HumaLOG) 0-5 UNITS TIDWMEALS SQ ;  Start 4/20/19 at 17:00


Dextrose (Dextrose 50%-Water Syringe) 12.5 gm PRN Q15MIN  PRN IV SEE COMMENTS;  

Start 4/20/19 at 15:15


Heparin Sodium (Porcine) (Heparin Sodium) 5,000 unit Q8HRS SQ  Last 

administered on 4/20/19at 16:39;  Start 4/20/19 at 16:00





Active Scripts


Active


Reported


Vitamin C (Ascorbic Acid) 500 Mg Tablet 500 Mg PO DAILY


Vitamin D3 (Cholecalciferol (Vitamin D3)) 5,000 Unit Tablet 5,000 Unit PO DAILY


Vitamin B-12 (Cyanocobalamin (Vitamin B-12)) 1,000 Mcg Tablet 1,000 Mcg PO DAILY


Ferrous Sulfate 325 Mg Tablet 325 Mg PO BID


Omeprazole 40 Mg Capsule.dr 40 Mg PO DAILY


Prednisolone Acetate 5 Ml Drops.susp 5 Ml OP BID


Aspirin Ec (Aspirin) 325 Mg Tablet.dr 325 Mg PO PRN DAILY PRN


Synthroid (Levothyroxine Sodium) 25 Mcg Tablet 1 Tab PO DAILY


Cyclopentolate Hcl 2 Ml Drops   


Bupropion Hcl Sr (Bupropion Hcl) 100 Mg Tablet.er   DAILY





Allergies


Allergies:





Allergies








Coded Allergies Type Severity Reaction Last Updated Verified


 


  Penicillins Allergy Severe sob 4/19/19 Yes


 


  shellfish derived Allergy Severe SHORT OF BREATH 4/19/19 Yes


 


  egg Allergy Mild NAUSEA/VOMITING 4/19/19 Yes











ROS


Review of System


The patient denies any associated fevers, chills, headache, ear pain, rhinorrhea

, sore throat, stiff neck, productive cough, chest pain, shortness of breath, 

back or flank pain, abdominal pain, nausea, vomiting, diarrhea, constipation, 

dysuria, rash, numbness, weakness, tingling, incontinence, difficulty  

ambulating, or diaphoresis.





Physical Exam


Physical Exam


General: Well developed, well nourished, no acute distress, well appearing


HEENT:  Pupils equally round and reactive to light, EOMI, no discharge, normal 

conjunctiva


Neck:  Supple, no nuchal rigidity, no JVD, trachea midline, no tenderness


Cardiac:  RRR, no murmurs, no gallops, no rubs


Chest/Lungs:  CTAB, no wheeze, no rhonchi, no crackles


Abdomen: soft, non-distended, no guarding, no peritoneal signs, non-tender 


Back:  No tenderness


Extremities:  no edema, pulses intact, non-tender,capillary refill <3 sec 

bilateral upper and lower extremities,


Neuro:  Alert and oriented x 4, no focal deficits, normal speech





Vitals


Vitals:





Vital Signs








  Date Time  Temp Pulse Resp B/P (MAP) Pulse Ox O2 Delivery O2 Flow Rate FiO2


 


4/20/19 15:31 98.3 95 18 129/61 (83)  Room Air  





 98.3       


 


4/20/19 13:25     93   


 


4/20/19 07:00       2.0 











Labs


Labs





Laboratory Tests








Test


 4/20/19


13:00


 


White Blood Count


 13.4 x10^3/uL


(4.0-11.0)


 


Red Blood Count


 3.95 x10^6/uL


(3.50-5.40)


 


Hemoglobin


 11.6 g/dL


(12.0-15.5)


 


Hematocrit


 37.0 %


(36.0-47.0)


 


Mean Corpuscular Volume 94 fL () 


 


Mean Corpuscular Hemoglobin 30 pg (25-35) 


 


Mean Corpuscular Hemoglobin


Concent 31 g/dL


(31-37)


 


Red Cell Distribution Width


 15.2 %


(11.5-14.5)


 


Platelet Count


 236 x10^3/uL


(140-400)


 


Neutrophils (%) (Auto) 75 % (31-73) 


 


Lymphocytes (%) (Auto) 12 % (24-48) 


 


Monocytes (%) (Auto) 13 % (0-9) 


 


Eosinophils (%) (Auto) 0 % (0-3) 


 


Basophils (%) (Auto) 1 % (0-3) 


 


Neutrophils # (Auto)


 10.0 x10^3uL


(1.8-7.7)


 


Lymphocytes # (Auto)


 1.6 x10^3/uL


(1.0-4.8)


 


Monocytes # (Auto)


 1.7 x10^3/uL


(0.0-1.1)


 


Eosinophils # (Auto)


 0.0 x10^3/uL


(0.0-0.7)


 


Basophils # (Auto)


 0.1 x10^3/uL


(0.0-0.2)


 


Sodium Level


 137 mmol/L


(136-145)


 


Potassium Level


 4.8 mmol/L


(3.5-5.1)


 


Chloride Level


 101 mmol/L


()


 


Carbon Dioxide Level


 28 mmol/L


(21-32)


 


Anion Gap 8 (6-14) 


 


Blood Urea Nitrogen


 14 mg/dL


(7-20)


 


Creatinine


 1.4 mg/dL


(0.6-1.0)


 


Estimated GFR


(Cockcroft-Gault) 36.1 





 


BUN/Creatinine Ratio 10 (6-20) 


 


Glucose Level


 143 mg/dL


(70-99)


 


Calcium Level


 9.7 mg/dL


(8.5-10.1)


 


Magnesium Level


 1.6 mg/dL


(1.8-2.4)


 


Total Bilirubin


 0.6 mg/dL


(0.2-1.0)


 


Aspartate Amino Transf


(AST/SGOT) 38 U/L (15-37) 





 


Alanine Aminotransferase


(ALT/SGPT) 29 U/L (14-59) 





 


Alkaline Phosphatase


 104 U/L


()


 


Total Protein


 6.9 g/dL


(6.4-8.2)


 


Albumin


 3.6 g/dL


(3.4-5.0)


 


Albumin/Globulin Ratio 1.1 (1.0-1.7) 








Laboratory Tests








Test


 4/20/19


13:00


 


White Blood Count


 13.4 x10^3/uL


(4.0-11.0)


 


Red Blood Count


 3.95 x10^6/uL


(3.50-5.40)


 


Hemoglobin


 11.6 g/dL


(12.0-15.5)


 


Hematocrit


 37.0 %


(36.0-47.0)


 


Mean Corpuscular Volume 94 fL () 


 


Mean Corpuscular Hemoglobin 30 pg (25-35) 


 


Mean Corpuscular Hemoglobin


Concent 31 g/dL


(31-37)


 


Red Cell Distribution Width


 15.2 %


(11.5-14.5)


 


Platelet Count


 236 x10^3/uL


(140-400)


 


Neutrophils (%) (Auto) 75 % (31-73) 


 


Lymphocytes (%) (Auto) 12 % (24-48) 


 


Monocytes (%) (Auto) 13 % (0-9) 


 


Eosinophils (%) (Auto) 0 % (0-3) 


 


Basophils (%) (Auto) 1 % (0-3) 


 


Neutrophils # (Auto)


 10.0 x10^3uL


(1.8-7.7)


 


Lymphocytes # (Auto)


 1.6 x10^3/uL


(1.0-4.8)


 


Monocytes # (Auto)


 1.7 x10^3/uL


(0.0-1.1)


 


Eosinophils # (Auto)


 0.0 x10^3/uL


(0.0-0.7)


 


Basophils # (Auto)


 0.1 x10^3/uL


(0.0-0.2)


 


Sodium Level


 137 mmol/L


(136-145)


 


Potassium Level


 4.8 mmol/L


(3.5-5.1)


 


Chloride Level


 101 mmol/L


()


 


Carbon Dioxide Level


 28 mmol/L


(21-32)


 


Anion Gap 8 (6-14) 


 


Blood Urea Nitrogen


 14 mg/dL


(7-20)


 


Creatinine


 1.4 mg/dL


(0.6-1.0)


 


Estimated GFR


(Cockcroft-Gault) 36.1 





 


BUN/Creatinine Ratio 10 (6-20) 


 


Glucose Level


 143 mg/dL


(70-99)


 


Calcium Level


 9.7 mg/dL


(8.5-10.1)


 


Magnesium Level


 1.6 mg/dL


(1.8-2.4)


 


Total Bilirubin


 0.6 mg/dL


(0.2-1.0)


 


Aspartate Amino Transf


(AST/SGOT) 38 U/L (15-37) 





 


Alanine Aminotransferase


(ALT/SGPT) 29 U/L (14-59) 





 


Alkaline Phosphatase


 104 U/L


()


 


Total Protein


 6.9 g/dL


(6.4-8.2)


 


Albumin


 3.6 g/dL


(3.4-5.0)


 


Albumin/Globulin Ratio 1.1 (1.0-1.7) 

















DAIN RENEE MD Apr 20, 2019 17:08

## 2019-04-20 NOTE — PDOC2
CONSULT


Date of Consult


Date of Consult


DATE: 4/20/19 


TIME: 14:42





Reason for Consult


Reason for Consult:


Hypotension, medication management





Referring Physician


Referring Physician:


Dr. Ac





History of Present Illness


Reason for Visit:


Ms Nunn is an 84yo F w/PMHx CHF, Hyperlipidemia, carotid artery disease, TIA

, constipation, GERD, anemia, h/o mantle cell Lymphoma, Hep B, anxiety, 

depression, chronic LBP, DM2, hypothyroidism, CKD, left eye blindness, 

osteopenia who was admitted for elective lap cholecystectomy from a home 

independent living facility where she lives with her  yesterday, no 

complications, remained inpatient for further monitoring post-operatively.





Today a code stroke was called when she was not responsive, found with blood 

pressure 50/30 at noon today, went for stat CT head, No acute intracranial 

process detected and Age-related atrophy is seen. She notes prior to the 

episode she saw stars after a PT session, which resolved on its own. Later she 

was being visited by her daughter, ate a hamburger for lunch and the episode 

occurred 30 minutes later when she felt the urge to have a BM and urinate, she 

saw black, told her daughter she was going to pass out, and immediately noted 

she was alert and surrounded by staff within 30 seconds. No confusion after the 

episode.





She was responsive to a fluid bolus, has a BP of 111/56. She has not had a BM 

post op.





Past Medical History


Cardiovascular:  CHF, Syncope, Hyperlipidemia, Other


Pulmonary:  No pertinent hx


CENTRAL NERVOUS SYSTEM:  TIA, Other


GI:  Constipation, GERD


Heme/Onc:  Anemia NOS, Cancer


Hepatobiliary:  Hep A/B/C


Psych:  Anxiety, Depression, Schizophrenia


Musculoskeletal:   low back pain, Osteoarthritis, Other


Infectious disease:  No pertinent hx


Renal/:  Chronic renal insuff, UTI, Urinary Incontinence


Endocrine:  Diabetes, Hypothyroidism, Osteopenia





Past Surgical History


Past Surgical History:  Cholecystectomy (4/19/19 - Dr. Ac), Total knee 

replacement, Hysterectomy, Other





Family History


Family History:  No Significant





Social History


No


ALCOHOL:  none


Drugs:  None


Lives:  with Family ( in independent adult living)





Current Medications


Current Medications





Current Medications


Ondansetron HCl (Zofran) 4 mg PRN Q6HRS  PRN IV NAUSEA/VOMITING;  Start 4/19/19 

at 07:00;  Stop 4/20/19 at 06:59;  Status DC


Fentanyl Citrate (Fentanyl 2ml Vial) 25 mcg PRN Q5MIN  PRN IV MILD PAIN;  Start 

4/19/19 at 07:00;  Stop 4/20/19 at 06:59;  Status DC


Fentanyl Citrate (Fentanyl 2ml Vial) 50 mcg PRN Q5MIN  PRN IV MODERATE TO 

SEVERE PAIN Last administered on 4/19/19at 09:26;  Start 4/19/19 at 07:00;  

Stop 4/20/19 at 06:59;  Status DC


Morphine Sulfate (Morphine Sulfate) 1 mg PRN Q10MIN  PRN IV SEVERE PAIN;  Start 

4/19/19 at 07:00;  Stop 4/20/19 at 06:59;  Status DC


Ringer's Solution 1,000 ml @  30 mls/hr Q24H IV  Last administered on 4/19/19at 

07:16;  Start 4/19/19 at 07:00;  Stop 4/19/19 at 18:59;  Status DC


Hydromorphone HCl (Dilaudid) 0.5 mg PRN Q10MIN  PRN IV SEV PAIN, Second choice;

  Start 4/19/19 at 07:00;  Stop 4/20/19 at 06:59;  Status DC


Prochlorperazine Edisylate (Compazine) 5 mg PACU PRN  PRN IV NAUSEA, MRX1;  

Start 4/19/19 at 07:00;  Stop 4/20/19 at 06:59;  Status DC


Levofloxacin/ Dextrose 100 ml @  100 mls/hr 1X PREOP  PRN IV PRIOR TO PROCEDURE 

Last administered on 4/19/19at 07:49;  Start 4/19/19 at 06:00;  Stop 4/19/19 at 

18:00;  Status DC


Bupivacaine HCl/ Epinephrine Bitart (Sensorcain-Mpf Epi 0.5%-1:337391) 30 ml STK

-MED ONCE .ROUTE  Last administered on 4/19/19at 08:15;  Start 4/19/19 at 05:52

;  Stop 4/19/19 at 06:52;  Status DC


Iohexol (Omnipaque 300 Mg/ml) 100 ml STK-MED ONCE .ROUTE ;  Start 4/19/19 at 05:

52;  Stop 4/19/19 at 06:52;  Status DC


Cellulose (Surgicel Hemostat 4x8) 1 each STK-MED ONCE .ROUTE ;  Start 4/19/19 

at 05:52;  Stop 4/19/19 at 06:53;  Status DC


Propofol 20 ml @ As Directed STK-MED ONCE IV ;  Start 4/19/19 at 07:35;  Stop 4/ 19/19 at 07:36;  Status DC


Dexamethasone Sodium Phosphate (Decadron) 20 mg STK-MED ONCE .ROUTE ;  Start 4/ 19/19 at 07:35;  Stop 4/19/19 at 07:36;  Status DC


Lidocaine HCl (Lidocaine Pf 2% Vial) 5 ml STK-MED ONCE .ROUTE ;  Start 4/19/19 

at 07:35;  Stop 4/19/19 at 07:36;  Status DC


Ondansetron HCl (Zofran) 4 mg STK-MED ONCE .ROUTE ;  Start 4/19/19 at 07:35;  

Stop 4/19/19 at 07:36;  Status DC


Rocuronium Bromide (Zemuron) 50 mg STK-MED ONCE .ROUTE ;  Start 4/19/19 at 07:36

;  Stop 4/19/19 at 07:37;  Status DC


Fentanyl Citrate (Fentanyl 2ml Vial) 100 mcg STK-MED ONCE .ROUTE ;  Start 4/19/ 19 at 07:36;  Stop 4/19/19 at 07:37;  Status DC


Glycopyrrolate (Robinul) 1 mg STK-MED ONCE .ROUTE ;  Start 4/19/19 at 08:23;  

Stop 4/19/19 at 08:24;  Status DC


Phenylephrine HCl (PHENYLEPHRINE in 0.9% NACL PF) 1 mg STK-MED ONCE IV ;  Start 

4/19/19 at 08:24;  Stop 4/19/19 at 08:25;  Status DC


Neostigmine Methylsulfate (Neostigmine Methylsulfate) 5 mg STK-MED ONCE .ROUTE 

;  Start 4/19/19 at 08:36;  Stop 4/19/19 at 08:37;  Status DC


Sodium Chloride (Normal Saline Flush) 3 ml QSHIFT  PRN IV AFTER MEDS AND BLOOD 

DRAWS;  Start 4/19/19 at 09:00


Morphine Sulfate (Morphine Sulfate) 2 mg PRN Q3HRS  PRN IV PAIN;  Start 4/19/19 

at 09:00


Oxycodone/ Acetaminophen (Percocet 5/325) 1 tab PRN Q4HRS  PRN PO MILD PAIN, 

1ST CHOICE;  Start 4/19/19 at 09:00


Oxycodone/ Acetaminophen (Percocet 5/325) 2 tab PRN Q4HRS  PRN PO MODERATE PAIN

, SEVERE PAIN;  Start 4/19/19 at 09:00


Ondansetron HCl (Zofran) 4 mg PRN Q6HRS  PRN IV NAUSEA, 1ST CHOICE Last 

administered on 4/19/19at 10:29;  Start 4/19/19 at 09:00


Dextrose/Lactated Ringer's 1,000 ml @  75 mls/hr [id number] IV  Last administered 

on 4/19/19at 19:41;  Start 4/19/19 at 08:46


Sevoflurane (Ultane) 30 ml STK-MED ONCE IH ;  Start 4/19/19 at 08:51;  Stop 4/19 /19 at 08:52;  Status DC


Fentanyl Citrate (Fentanyl 2ml Vial) 100 mcg STK-MED ONCE .ROUTE ;  Start 4/19/ 19 at 09:06;  Stop 4/19/19 at 09:07;  Status DC


Prochlorperazine Edisylate (Compazine) 10 mg 1X  ONCE IV  Last administered on 4 /19/19at 12:45;  Start 4/19/19 at 12:45;  Stop 4/19/19 at 12:46;  Status DC


Magnesium Sulfate 50 ml @ 25 mls/hr 1X  ONCE IV ;  Start 4/20/19 at 14:30;  

Stop 4/20/19 at 16:29


Sodium Chloride 1,000 ml @  125 mls/hr Q8H IV ;  Start 4/20/19 at 14:15





Active Scripts


Active


Reported


Vitamin C (Ascorbic Acid) 500 Mg Tablet 500 Mg PO DAILY


Vitamin D3 (Cholecalciferol (Vitamin D3)) 5,000 Unit Tablet 5,000 Unit PO DAILY


Vitamin B-12 (Cyanocobalamin (Vitamin B-12)) 1,000 Mcg Tablet 1,000 Mcg PO DAILY


Ferrous Sulfate 325 Mg Tablet 325 Mg PO BID


Omeprazole 40 Mg Capsule.dr 40 Mg PO DAILY


Prednisolone Acetate 5 Ml Drops.susp 5 Ml OP BID


Aspirin Ec (Aspirin) 325 Mg Tablet.dr 325 Mg PO PRN DAILY PRN


Synthroid (Levothyroxine Sodium) 25 Mcg Tablet 1 Tab PO DAILY


Cyclopentolate Hcl 2 Ml Drops   


Bupropion Hcl Sr (Bupropion Hcl) 100 Mg Tablet.er   DAILY





Allergies


Allergies:  


Coded Allergies:  


     Penicillins (Verified  Allergy, Severe, sob, 4/19/19)


     shellfish derived (Verified  Allergy, Severe, SHORT OF BREATH, 4/19/19)


     egg (Verified  Allergy, Mild, NAUSEA/VOMITING, 4/19/19)





ROS


General:  YES: Fatigue, Malaise; 


   No: Chills, Night Sweats, Appetite, Other


PSYCHOLOGICAL ROS:  No: Anxiety, Behavioral Disorder, Concentration difficultie

, Decreased libido, Depression, Disorientation, Hallucinations, Hostility, 

Irritablity, Memory difficulties, Mood Swings, Obsessive thoughts, Physical 

abuse, Sexual abuse, Sleep disturbances, Suicidal ideation, Other


Eyes:  No Blurry vision, No Decreased vision, No Double vision, No Dry eyes, No 

Excessive tearing, No Eye Pain, No Itchy Eyes, No Loss of vision, No Photophobia

, No Scotomata, No Uses contacts, No Uses glasses, No Other


HEENT:  No: Heacaches, Visual Changes, Hearing change, Nasal congestion, Nasal 

discharge, Oral lesions, Sinus pain, Sore Throat, Epistaxis, Sneezing, Snoring, 

Tinnitus, Vertigo, Vocal changes, Other


ALLERGY AND IMMUNOLOGY:  No: Hives, Insect Bite Sensitivity, Itchy/Watery Eyes, 

Nasal Congestion, Post Nasal Drip, Seasonal Allergies, Other


Hematological and Lymphatic:  No: Bleeding Problems, Blood Clots, Blood 

Transfusions, Brusing, Night Sweats, Pallor, Swollen Lymph Nodes, Other


ENDOCRINE:  No: Breast Changes, Galactorrhea, Hair Pattern Changes, Hot Flashes

, Malaise/lethargy, Mood Swings, Palpitations, Polydipsia/polyuria, Skin Changes

, Temperature Intolerance, Unexpected Weight Changes, Other


Breast:  No New/Changing Breast Lumps, No Nipple changes, No Nipple discharge, 

No Other


Respiratory:  No: Cough, Hemoptysis, Orthopnea, Pleuritic Pain, Shortness of 

breath, SOB with excertion, Sputum Changes, Stridor, Tachypnea, Wheezing, Other


Cardiovascular:  No Chest Pain, No Palpitations, No Orthopnea, No Paroxysmal 

Noc. Dyspnea, No Edema, No Lt Headedness, No Other


Gastrointestinal:  No Nausea, No Vomiting, No Abdominal Pain, No Diarrhea, No 

Constipation, No Melena, No Hematochezia, No Other


Genitourinary:  No Dysuria, No Frequency, No Incontinence, No Hematuria, No 

Retention, No Discharge, No Urgency, No Pain, No Flank Pain, No Other, No , No 

, No , No , No , No , No 


Musculoskeletal:  No Gait Disturbance, No Joint Pain, No Joint Stiffness, No 

Joint Swelling, No Muscle Pain, No Muscular Weakness, No Pain In:, No Swelling 

In:, No Other


Neurological:  No Behavorial Changes, No Bowel/Bladder ControlChng, No Confusion

, No Dizziness, No Gait Disturbance, No Headaches, No Impaired Coord/balance, 

No Memory Loss, No Numbness/Tingling, No Seizures, No Speech Problems, No 

Tremors, No Visual Changes, No Weakness, No Other


Skin:  No Dry Skin, No Eczema, No Hair Changes, No Lumps, No Mole Changes, No 

Mottling, No Nail Changes, No Pruritus, No Rash, No Skin Lesion Changes, No 

Other, No Acne





Physical Exam


General:  Alert, Oriented X3, Cooperative, No acute distress


HEENT:  Atraumatic, PERRLA, EOMI, Mucous membr. moist/pink


Lungs:  Clear to auscultation, Normal air movement


Heart:  Regular rate, Normal S1, Normal S2, No murmurs


Abdomen:  Normal bowel sounds, Soft, No tenderness, No hepatosplenomegaly, No 

masses, Other (Suture lines dry and intact)


Extremities:  No clubbing, No cyanosis, No edema, Normal pulses, No tenderness/

swelling


Skin:  No rashes, No breakdown


Neuro:  Normal gait, Normal speech, Normal tone, Sensation intact, Reflexes 2+


Psych/Mental Status:  Mental status NL, Mood NL


MUSCULOSKELETAL:  No joint tenderness, No deformity, No swelling, No muscular 

tenderness noted, Full range of motion without pain





Vitals


VITALS





Vital Signs








  Date Time  Temp Pulse Resp B/P (MAP) Pulse Ox O2 Delivery O2 Flow Rate FiO2


 


4/20/19 13:25 97.6 90 20 111/56 (74) 93 Room Air  





 97.6       


 


4/20/19 07:00       2.0 











Labs


Labs





Laboratory Tests








Test


 4/20/19


13:00


 


White Blood Count


 13.4 x10^3/uL


(4.0-11.0)


 


Red Blood Count


 3.95 x10^6/uL


(3.50-5.40)


 


Hemoglobin


 11.6 g/dL


(12.0-15.5)


 


Hematocrit


 37.0 %


(36.0-47.0)


 


Mean Corpuscular Volume 94 fL () 


 


Mean Corpuscular Hemoglobin 30 pg (25-35) 


 


Mean Corpuscular Hemoglobin


Concent 31 g/dL


(31-37)


 


Red Cell Distribution Width


 15.2 %


(11.5-14.5)


 


Platelet Count


 236 x10^3/uL


(140-400)


 


Neutrophils (%) (Auto) 75 % (31-73) 


 


Lymphocytes (%) (Auto) 12 % (24-48) 


 


Monocytes (%) (Auto) 13 % (0-9) 


 


Eosinophils (%) (Auto) 0 % (0-3) 


 


Basophils (%) (Auto) 1 % (0-3) 


 


Neutrophils # (Auto)


 10.0 x10^3uL


(1.8-7.7)


 


Lymphocytes # (Auto)


 1.6 x10^3/uL


(1.0-4.8)


 


Monocytes # (Auto)


 1.7 x10^3/uL


(0.0-1.1)


 


Eosinophils # (Auto)


 0.0 x10^3/uL


(0.0-0.7)


 


Basophils # (Auto)


 0.1 x10^3/uL


(0.0-0.2)


 


Sodium Level


 137 mmol/L


(136-145)


 


Potassium Level


 4.8 mmol/L


(3.5-5.1)


 


Chloride Level


 101 mmol/L


()


 


Carbon Dioxide Level


 28 mmol/L


(21-32)


 


Anion Gap 8 (6-14) 


 


Blood Urea Nitrogen


 14 mg/dL


(7-20)


 


Creatinine


 1.4 mg/dL


(0.6-1.0)


 


Estimated GFR


(Cockcroft-Gault) 36.1 





 


BUN/Creatinine Ratio 10 (6-20) 


 


Glucose Level


 143 mg/dL


(70-99)


 


Calcium Level


 9.7 mg/dL


(8.5-10.1)


 


Magnesium Level


 1.6 mg/dL


(1.8-2.4)


 


Total Bilirubin


 0.6 mg/dL


(0.2-1.0)


 


Aspartate Amino Transf


(AST/SGOT) 38 U/L (15-37) 





 


Alanine Aminotransferase


(ALT/SGPT) 29 U/L (14-59) 





 


Alkaline Phosphatase


 104 U/L


()


 


Total Protein


 6.9 g/dL


(6.4-8.2)


 


Albumin


 3.6 g/dL


(3.4-5.0)


 


Albumin/Globulin Ratio 1.1 (1.0-1.7) 








Laboratory Tests








Test


 4/20/19


13:00


 


White Blood Count


 13.4 x10^3/uL


(4.0-11.0)


 


Red Blood Count


 3.95 x10^6/uL


(3.50-5.40)


 


Hemoglobin


 11.6 g/dL


(12.0-15.5)


 


Hematocrit


 37.0 %


(36.0-47.0)


 


Mean Corpuscular Volume 94 fL () 


 


Mean Corpuscular Hemoglobin 30 pg (25-35) 


 


Mean Corpuscular Hemoglobin


Concent 31 g/dL


(31-37)


 


Red Cell Distribution Width


 15.2 %


(11.5-14.5)


 


Platelet Count


 236 x10^3/uL


(140-400)


 


Neutrophils (%) (Auto) 75 % (31-73) 


 


Lymphocytes (%) (Auto) 12 % (24-48) 


 


Monocytes (%) (Auto) 13 % (0-9) 


 


Eosinophils (%) (Auto) 0 % (0-3) 


 


Basophils (%) (Auto) 1 % (0-3) 


 


Neutrophils # (Auto)


 10.0 x10^3uL


(1.8-7.7)


 


Lymphocytes # (Auto)


 1.6 x10^3/uL


(1.0-4.8)


 


Monocytes # (Auto)


 1.7 x10^3/uL


(0.0-1.1)


 


Eosinophils # (Auto)


 0.0 x10^3/uL


(0.0-0.7)


 


Basophils # (Auto)


 0.1 x10^3/uL


(0.0-0.2)


 


Sodium Level


 137 mmol/L


(136-145)


 


Potassium Level


 4.8 mmol/L


(3.5-5.1)


 


Chloride Level


 101 mmol/L


()


 


Carbon Dioxide Level


 28 mmol/L


(21-32)


 


Anion Gap 8 (6-14) 


 


Blood Urea Nitrogen


 14 mg/dL


(7-20)


 


Creatinine


 1.4 mg/dL


(0.6-1.0)


 


Estimated GFR


(Cockcroft-Gault) 36.1 





 


BUN/Creatinine Ratio 10 (6-20) 


 


Glucose Level


 143 mg/dL


(70-99)


 


Calcium Level


 9.7 mg/dL


(8.5-10.1)


 


Magnesium Level


 1.6 mg/dL


(1.8-2.4)


 


Total Bilirubin


 0.6 mg/dL


(0.2-1.0)


 


Aspartate Amino Transf


(AST/SGOT) 38 U/L (15-37) 





 


Alanine Aminotransferase


(ALT/SGPT) 29 U/L (14-59) 





 


Alkaline Phosphatase


 104 U/L


()


 


Total Protein


 6.9 g/dL


(6.4-8.2)


 


Albumin


 3.6 g/dL


(3.4-5.0)


 


Albumin/Globulin Ratio 1.1 (1.0-1.7) 











Images


Images


CT Head - 1. No acute intracranial process detected. 


   2. Age-related atrophy is seen.





Assessment/Plan


Assessment/Plan


A/P:


Syncope - this seems to be a vaso-vagal episode, particularly considering she 

had no confusion, was very responsive. Will get her on a gentle bowel regimen 

and check PVR to screen for urinary retention. Cont tele


H/o dCHF - seems very compensated, will give gentle fluids x1 L and top


Hyperlipidemia - cont statin


Carotid artery disease - with h/o TIA - stable


Constipation - she is adverse to psyllium, will add miralax, colace BID and prn 

mag hydroxide for now. 


GERD - cont PPI


Anemia - likely of chronic renal insufficiency. Will monitor. Would need EPO if 

Hb drops less than 10.


H/o mantle cell Lymphoma - in remission


Hep B - h/o, stable


Anxiety with depression - on wellbutrin with good effects


Chronic LBP - stable on tylenol


?DM2 - historic dx, not on meds currently. will place on sliding scale


Hypothyroidism - acquire atrophy of thyroid, cont 25mcg synthroid daily


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


Osteopenia - cont calcium and vitamin D





FEN - ADA diet, low fat


PPX - Heparin post op


FULL CODE


Inpatient for post-op monitoring, syncope. Likely home tomorrow.











STACIE CALERO MD Apr 20, 2019 15:26

## 2019-04-20 NOTE — NUR
Code stroke was called. When I arrived in room Gianni RN was doing an assessment, Vitals were 
taken 58/30 was the first. Patient is blind in Left eye and is asymmetrical.  IV was started 
and fluids ran wide open.  Bp trended at this range for 10minutes.  At 1305 her BP was 
90/48.  CT was ordered per protocol, Dr. Easley notified. Patient transported to CT then to 
room 202. Consult for IPC was placed per Dr. Easley. Report called to Sylvia PIERCE.

## 2019-04-20 NOTE — RAD
Exam performed: CT scan of the head without contrast. 

 

Date of Service: 1/16/14. Comparison: CT head without contrast from 

4/5/2016.

 

Clinical History: Dizziness and syncope.

 

Technique: Helical acquisitions are obtained from the foramen magnum to 

the vertex without intravenous administration of contrast. 

 

Findings:

 

Mild prominence of cortical sulci and ventricular system is noted 

consistent with age-related atrophy. Areas of low-attenuation in 

periventricular and subcortical deep white matter suggesting mild small 

vessel ischemic changes.. Normal gray-white differentiation is maintained.

There is no extra axial fluid collection, intraparenchymal hemorrhage or 

mass lesion.  The visualized portions of the orbits, paranasal sinuses and

the mastoid air cells appear clear.  The calvarium is intact.  

 

Impression:

 

1. No acute intracranial process detected. 

2. Age-related atrophy is seen. 

 

PQRS Compliance Statement:

 

One or more of the following individualized dose reduction techniques were

utilized for this examination:

1. Automated exposure control

2. Adjustment of the mA and/or kV according to patient size

3. Use of iterative reconstruction technique

 

Results of this negative stroke protocol CT scan of the brain were given 

to the nurse taking care of the patient on the floor at the time of 

dictation at 1:28 PM

 

 

Electronically signed by: Dixie Monson MD (4/20/2019 1:28 PM) [facility]

## 2019-04-20 NOTE — PDOC
PROGRESS NOTES


Subjective


Subjective


doing well





Objective


Objective





Vital Signs








  Date Time  Temp Pulse Resp B/P (MAP) Pulse Ox O2 Delivery O2 Flow Rate FiO2


 


4/20/19 11:00 97.6 73 18 102/40 (60) 96 Room Air  





 97.6       


 


4/20/19 07:00       2.0 














Intake and Output 


 


 4/20/19





 07:00


 


Intake Total 760 ml


 


Output Total 10 ml


 


Balance 750 ml


 


 


 


Intake Oral 360 ml


 


IV Total 400 ml


 


Output Estimated Blood Loss 10 ml


 


# Voids 2











Physical Exam


Abdomen:  Soft, No tenderness





Plan


Plan of Care


discharge





Comment


Review of Relevant


I have reviewed the following items lala (where applicable) has been applied.


Medications





Current Medications


Ondansetron HCl (Zofran) 4 mg PRN Q6HRS  PRN IV NAUSEA/VOMITING;  Start 4/19/19 

at 07:00;  Stop 4/20/19 at 06:59;  Status DC


Fentanyl Citrate (Fentanyl 2ml Vial) 25 mcg PRN Q5MIN  PRN IV MILD PAIN;  Start 

4/19/19 at 07:00;  Stop 4/20/19 at 06:59;  Status DC


Fentanyl Citrate (Fentanyl 2ml Vial) 50 mcg PRN Q5MIN  PRN IV MODERATE TO 

SEVERE PAIN Last administered on 4/19/19at 09:26;  Start 4/19/19 at 07:00;  

Stop 4/20/19 at 06:59;  Status DC


Morphine Sulfate (Morphine Sulfate) 1 mg PRN Q10MIN  PRN IV SEVERE PAIN;  Start 

4/19/19 at 07:00;  Stop 4/20/19 at 06:59;  Status DC


Ringer's Solution 1,000 ml @  30 mls/hr Q24H IV  Last administered on 4/19/19at 

07:16;  Start 4/19/19 at 07:00;  Stop 4/19/19 at 18:59;  Status DC


Hydromorphone HCl (Dilaudid) 0.5 mg PRN Q10MIN  PRN IV SEV PAIN, Second choice;

  Start 4/19/19 at 07:00;  Stop 4/20/19 at 06:59;  Status DC


Prochlorperazine Edisylate (Compazine) 5 mg PACU PRN  PRN IV NAUSEA, MRX1;  

Start 4/19/19 at 07:00;  Stop 4/20/19 at 06:59;  Status DC


Levofloxacin/ Dextrose 100 ml @  100 mls/hr 1X PREOP  PRN IV PRIOR TO PROCEDURE 

Last administered on 4/19/19at 07:49;  Start 4/19/19 at 06:00;  Stop 4/19/19 at 

18:00;  Status DC


Bupivacaine HCl/ Epinephrine Bitart (Sensorcain-Mpf Epi 0.5%-1:712646) 30 ml STK

-MED ONCE .ROUTE  Last administered on 4/19/19at 08:15;  Start 4/19/19 at 05:52

;  Stop 4/19/19 at 06:52;  Status DC


Iohexol (Omnipaque 300 Mg/ml) 100 ml STK-MED ONCE .ROUTE ;  Start 4/19/19 at 05:

52;  Stop 4/19/19 at 06:52;  Status DC


Cellulose (Surgicel Hemostat 4x8) 1 each STK-MED ONCE .ROUTE ;  Start 4/19/19 

at 05:52;  Stop 4/19/19 at 06:53;  Status DC


Propofol 20 ml @ As Directed STK-MED ONCE IV ;  Start 4/19/19 at 07:35;  Stop 4/ 19/19 at 07:36;  Status DC


Dexamethasone Sodium Phosphate (Decadron) 20 mg STK-MED ONCE .ROUTE ;  Start 4/ 19/19 at 07:35;  Stop 4/19/19 at 07:36;  Status DC


Lidocaine HCl (Lidocaine Pf 2% Vial) 5 ml STK-MED ONCE .ROUTE ;  Start 4/19/19 

at 07:35;  Stop 4/19/19 at 07:36;  Status DC


Ondansetron HCl (Zofran) 4 mg STK-MED ONCE .ROUTE ;  Start 4/19/19 at 07:35;  

Stop 4/19/19 at 07:36;  Status DC


Rocuronium Bromide (Zemuron) 50 mg STK-MED ONCE .ROUTE ;  Start 4/19/19 at 07:36

;  Stop 4/19/19 at 07:37;  Status DC


Fentanyl Citrate (Fentanyl 2ml Vial) 100 mcg STK-MED ONCE .ROUTE ;  Start 4/19/ 19 at 07:36;  Stop 4/19/19 at 07:37;  Status DC


Glycopyrrolate (Robinul) 1 mg STK-MED ONCE .ROUTE ;  Start 4/19/19 at 08:23;  

Stop 4/19/19 at 08:24;  Status DC


Phenylephrine HCl (PHENYLEPHRINE in 0.9% NACL PF) 1 mg STK-MED ONCE IV ;  Start 

4/19/19 at 08:24;  Stop 4/19/19 at 08:25;  Status DC


Neostigmine Methylsulfate (Neostigmine Methylsulfate) 5 mg STK-MED ONCE .ROUTE 

;  Start 4/19/19 at 08:36;  Stop 4/19/19 at 08:37;  Status DC


Sodium Chloride (Normal Saline Flush) 3 ml QSHIFT  PRN IV AFTER MEDS AND BLOOD 

DRAWS;  Start 4/19/19 at 09:00


Morphine Sulfate (Morphine Sulfate) 2 mg PRN Q3HRS  PRN IV PAIN;  Start 4/19/19 

at 09:00


Oxycodone/ Acetaminophen (Percocet 5/325) 1 tab PRN Q4HRS  PRN PO MILD PAIN, 

1ST CHOICE;  Start 4/19/19 at 09:00


Oxycodone/ Acetaminophen (Percocet 5/325) 2 tab PRN Q4HRS  PRN PO MODERATE PAIN

, SEVERE PAIN;  Start 4/19/19 at 09:00


Ondansetron HCl (Zofran) 4 mg PRN Q6HRS  PRN IV NAUSEA, 1ST CHOICE Last 

administered on 4/19/19at 10:29;  Start 4/19/19 at 09:00


Dextrose/Lactated Ringer's 1,000 ml @  75 mls/hr [id number] IV  Last administered 

on 4/19/19at 19:41;  Start 4/19/19 at 08:46


Sevoflurane (Ultane) 30 ml STK-MED ONCE IH ;  Start 4/19/19 at 08:51;  Stop 4/19 /19 at 08:52;  Status DC


Fentanyl Citrate (Fentanyl 2ml Vial) 100 mcg STK-MED ONCE .ROUTE ;  Start 4/19/ 19 at 09:06;  Stop 4/19/19 at 09:07;  Status DC


Prochlorperazine Edisylate (Compazine) 10 mg 1X  ONCE IV  Last administered on 4 /19/19at 12:45;  Start 4/19/19 at 12:45;  Stop 4/19/19 at 12:46;  Status DC





Active Scripts


Active


Reported


Vitamin C (Ascorbic Acid) 500 Mg Tablet 500 Mg PO DAILY


Vitamin D3 (Cholecalciferol (Vitamin D3)) 5,000 Unit Tablet 5,000 Unit PO DAILY


Vitamin B-12 (Cyanocobalamin (Vitamin B-12)) 1,000 Mcg Tablet 1,000 Mcg PO DAILY


Ferrous Sulfate 325 Mg Tablet 325 Mg PO BID


Omeprazole 40 Mg Capsule.dr 40 Mg PO DAILY


Prednisolone Acetate 5 Ml Drops.susp 5 Ml OP BID


Aspirin Ec (Aspirin) 325 Mg Tablet.dr 325 Mg PO PRN DAILY PRN


Synthroid (Levothyroxine Sodium) 25 Mcg Tablet 1 Tab PO DAILY


Cyclopentolate Hcl 2 Ml Drops   


Bupropion Hcl Sr (Bupropion Hcl) 100 Mg Tablet.er   DAILY


Vitals/I & O





Vital Sign - Last 24 Hours








 4/19/19 4/19/19 4/19/19 4/19/19





 15:00 19:00 19:45 19:59


 


Temp 98.1 97.6  





 98.1 97.6  


 


Pulse 63 74  


 


Resp 16 18  


 


B/P (MAP) 142/66 (91) 133/55 (81)  


 


Pulse Ox 98 98  


 


O2 Delivery Room Air Room Air Nasal Cannula Nasal Cannula


 


O2 Flow Rate   2.0 2.0


 


    





    





 4/19/19 4/20/19 4/20/19 4/20/19





 23:00 03:00 07:00 08:00


 


Temp 97.6 97.9 98.0 





 97.6 97.9 98.0 


 


Pulse 73 71 80 


 


Resp 18 18 18 


 


B/P (MAP) 123/48 (73) 120/50 (73) 121/54 (76) 


 


Pulse Ox 95 96 96 


 


O2 Delivery Room Air Room Air Nasal Cannula Room Air


 


O2 Flow Rate   2.0 


 


    





    





 4/20/19   





 11:00   


 


Temp 97.6   





 97.6   


 


Pulse 73   


 


Resp 18   


 


B/P (MAP) 102/40 (60)   


 


Pulse Ox 96   


 


O2 Delivery Room Air   














Intake and Output   


 


 4/19/19 4/19/19 4/20/19





 15:00 23:00 07:00


 


Intake Total 400 ml 360 ml 


 


Output Total 10 ml  


 


Balance 390 ml 360 ml 

















ROBLES TRAN MD Apr 20, 2019 11:58

## 2019-04-20 NOTE — DISCH
DISCHARGE INSTRUCTIONS


Condition on Discharge


Condition on Discharge:  Stable





Activity After Discharge


Activity Instructions for Disc:  Activity as tolerated, Other, see below (No 

lifting over 20 lbs X 2 weeks)





Diet after Discharge


Diet after Discharge:  Regular





Follow-Up


Follow up with:  Dr Ac in 2 weeks











ROBLES TRAN MD Apr 20, 2019 12:00

## 2019-04-20 NOTE — NUR
P arrived to unit from [place], report received from 4N RN prior to transfer.  Pt alert and 
oriented upon arrival.  Dr. Ha and Dr. Olmedo aware of transfer.  NS running at 999, BP 
111/56  [id number]

## 2019-04-21 VITALS — DIASTOLIC BLOOD PRESSURE: 58 MMHG | SYSTOLIC BLOOD PRESSURE: 125 MMHG

## 2019-04-21 VITALS — DIASTOLIC BLOOD PRESSURE: 61 MMHG | SYSTOLIC BLOOD PRESSURE: 135 MMHG

## 2019-04-21 VITALS — DIASTOLIC BLOOD PRESSURE: 50 MMHG | SYSTOLIC BLOOD PRESSURE: 103 MMHG

## 2019-04-21 LAB
ANION GAP SERPL CALC-SCNC: 8 MMOL/L (ref 6–14)
BASOPHILS # BLD AUTO: 0.1 X10^3/UL (ref 0–0.2)
BASOPHILS NFR BLD: 1 % (ref 0–3)
BUN SERPL-MCNC: 15 MG/DL (ref 7–20)
CALCIUM SERPL-MCNC: 8.9 MG/DL (ref 8.5–10.1)
CHLORIDE SERPL-SCNC: 103 MMOL/L (ref 98–107)
CO2 SERPL-SCNC: 22 MMOL/L (ref 21–32)
CREAT SERPL-MCNC: 1 MG/DL (ref 0.6–1)
EOSINOPHIL NFR BLD: 0.3 X10^3/UL (ref 0–0.7)
EOSINOPHIL NFR BLD: 3 % (ref 0–3)
ERYTHROCYTE [DISTWIDTH] IN BLOOD BY AUTOMATED COUNT: 16.5 % (ref 11.5–14.5)
GFR SERPLBLD BASED ON 1.73 SQ M-ARVRAT: 53.2 ML/MIN
GLUCOSE SERPL-MCNC: 155 MG/DL (ref 70–99)
HCT VFR BLD CALC: 37.8 % (ref 36–47)
HGB BLD-MCNC: 11.7 G/DL (ref 12–15.5)
LYMPHOCYTES # BLD: 2.1 X10^3/UL (ref 1–4.8)
LYMPHOCYTES NFR BLD AUTO: 26 % (ref 24–48)
MCH RBC QN AUTO: 31 PG (ref 25–35)
MCHC RBC AUTO-ENTMCNC: 31 G/DL (ref 31–37)
MCV RBC AUTO: 99 FL (ref 79–100)
MONO #: 0.9 X10^3/UL (ref 0–1.1)
MONOCYTES NFR BLD: 12 % (ref 0–9)
NEUT #: 4.7 X10^3UL (ref 1.8–7.7)
NEUTROPHILS NFR BLD AUTO: 58 % (ref 31–73)
PLATELET # BLD AUTO: 182 X10^3/UL (ref 140–400)
POTASSIUM SERPL-SCNC: 3.8 MMOL/L (ref 3.5–5.1)
RBC # BLD AUTO: 3.83 X10^6/UL (ref 3.5–5.4)
SODIUM SERPL-SCNC: 133 MMOL/L (ref 136–145)
WBC # BLD AUTO: 8.2 X10^3/UL (ref 4–11)

## 2019-04-21 RX ADMIN — BUPROPION HYDROCHLORIDE SCH MG: 100 TABLET, FILM COATED, EXTENDED RELEASE ORAL at 08:52

## 2019-04-21 RX ADMIN — DOCUSATE SODIUM SCH MG: 100 CAPSULE, LIQUID FILLED ORAL at 08:52

## 2019-04-21 RX ADMIN — HEPARIN SODIUM SCH UNIT: 5000 INJECTION, SOLUTION INTRAVENOUS; SUBCUTANEOUS at 05:52

## 2019-04-21 RX ADMIN — Medication SCH MG: at 08:52

## 2019-04-21 RX ADMIN — CYANOCOBALAMIN TAB 1000 MCG SCH MCG: 1000 TAB at 08:52

## 2019-04-21 RX ADMIN — INSULIN LISPRO SCH UNITS: 100 INJECTION, SOLUTION INTRAVENOUS; SUBCUTANEOUS at 08:00

## 2019-04-21 RX ADMIN — OXYCODONE HYDROCHLORIDE AND ACETAMINOPHEN SCH MG: 500 TABLET ORAL at 08:52

## 2019-04-21 RX ADMIN — CHOLECALCIFEROL CAP 125 MCG (5000 UNIT) SCH UNIT: 125 CAP at 08:52

## 2019-04-21 NOTE — PDOC
PROGRESS NOTES


Chief Complaint


Chief Complaint


A/P:


Syncope - this seems to be a vaso-vagal episode, particularly considering she 

had no confusion, was very responsive. Will get her on a gentle bowel regimen 

and check PVR to screen for urinary retention. Cont tele


H/o dCHF - seems very compensated, will give gentle fluids x1 L and top


Hyperlipidemia - cont statin


Carotid artery disease - with h/o TIA - stable


Constipation - she is adverse to psyllium, will add miralax, colace BID and prn 

mag hydroxide for now. 


GERD - cont PPI


Anemia - likely of chronic renal insufficiency. Will monitor. Would need EPO if 

Hb drops less than 10.


H/o mantle cell Lymphoma - in remission


Hep B - h/o, stable


Anxiety with depression - on wellbutrin with good effects


Chronic LBP - stable on tylenol


?DM2 - historic dx, not on meds currently. will place on sliding scale


Hypothyroidism - acquire atrophy of thyroid, cont 25mcg synthroid daily


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


Osteopenia - cont calcium and vitamin D





FEN - ADA diet, low fat


PPX - Heparin post op


FULL CODE


Inpatient for post-op monitoring, syncope. 





Ok for D/c today, has been stable





History of Present Illness


History of Present Illness


Ms Nunn is an 84yo F w/PMHx CHF, Hyperlipidemia, carotid artery disease, TIA

, constipation, GERD, anemia, h/o mantle cell Lymphoma, Hep B, anxiety, 

depression, chronic LBP, DM2, hypothyroidism, CKD, left eye blindness, 

osteopenia who was admitted for elective lap cholecystectomy from a home 

independent living facility where she lives with her  yesterday, no 

complications, remained inpatient for further monitoring post-operatively.


Today a code stroke was called when she was not responsive, found with blood 

pressure 50/30 at noon today, went for stat CT head, No acute intracranial 

process detected and Age-related atrophy is seen. She notes prior to the 

episode she saw stars after a PT session, which resolved on its own. Later she 

was being visited by her daughter, ate a hamburger for lunch and the episode 

occurred 30 minutes later when she felt the urge to have a BM and urinate, she 

saw black, told her daughter she was going to pass out, and immediately noted 

she was alert and surrounded by staff within 30 seconds. No confusion after the 

episode.


She was responsive to a fluid bolus, has a BP of 111/56. She had not had a BM 

post op.





Mag level 1.6 last night, replaced. BP well controlled. Passing flatus well





Vitals


Vitals





Vital Signs








  Date Time  Temp Pulse Resp B/P (MAP) Pulse Ox O2 Delivery O2 Flow Rate FiO2


 


4/21/19 07:45      Room Air  


 


4/21/19 07:20 98.8 83  135/61 (85) 92   





 98.8       


 


4/21/19 04:10   18     


 


4/20/19 07:00       2.0 











Physical Exam


General:  Alert, Oriented X3, Cooperative, No acute distress


Heart:  Regular rate, Normal S1, Normal S2, No murmurs


Lungs:  Clear


Abdomen:  Normal bowel sounds, Soft, No tenderness, No hepatosplenomegaly, No 

masses, Other (Suture lines dry and intact)


Extremities:  No clubbing, No cyanosis, No edema, Normal pulses, No tenderness/

swelling


Skin:  No rashes, No breakdown





Labs


LABS





Laboratory Tests








Test


 4/20/19


13:00 4/20/19


17:00 4/20/19


21:44 4/21/19


08:10


 


White Blood Count


 13.4 x10^3/uL


(4.0-11.0) 


 


 





 


Red Blood Count


 3.95 x10^6/uL


(3.50-5.40) 


 


 





 


Hemoglobin


 11.6 g/dL


(12.0-15.5) 


 


 





 


Hematocrit


 37.0 %


(36.0-47.0) 


 


 





 


Mean Corpuscular Volume 94 fL ()    


 


Mean Corpuscular Hemoglobin 30 pg (25-35)    


 


Mean Corpuscular Hemoglobin


Concent 31 g/dL


(31-37) 


 


 





 


Red Cell Distribution Width


 15.2 %


(11.5-14.5) 


 


 





 


Platelet Count


 236 x10^3/uL


(140-400) 


 


 





 


Neutrophils (%) (Auto) 75 % (31-73)    


 


Lymphocytes (%) (Auto) 12 % (24-48)    


 


Monocytes (%) (Auto) 13 % (0-9)    


 


Eosinophils (%) (Auto) 0 % (0-3)    


 


Basophils (%) (Auto) 1 % (0-3)    


 


Neutrophils # (Auto)


 10.0 x10^3uL


(1.8-7.7) 


 


 





 


Lymphocytes # (Auto)


 1.6 x10^3/uL


(1.0-4.8) 


 


 





 


Monocytes # (Auto)


 1.7 x10^3/uL


(0.0-1.1) 


 


 





 


Eosinophils # (Auto)


 0.0 x10^3/uL


(0.0-0.7) 


 


 





 


Basophils # (Auto)


 0.1 x10^3/uL


(0.0-0.2) 


 


 





 


Sodium Level


 137 mmol/L


(136-145) 


 


 





 


Potassium Level


 4.8 mmol/L


(3.5-5.1) 


 


 





 


Chloride Level


 101 mmol/L


() 


 


 





 


Carbon Dioxide Level


 28 mmol/L


(21-32) 


 


 





 


Anion Gap 8 (6-14)    


 


Blood Urea Nitrogen


 14 mg/dL


(7-20) 


 


 





 


Creatinine


 1.4 mg/dL


(0.6-1.0) 


 


 





 


Estimated GFR


(Cockcroft-Gault) 36.1 


 


 


 





 


BUN/Creatinine Ratio 10 (6-20)    


 


Glucose Level


 143 mg/dL


(70-99) 


 


 





 


Calcium Level


 9.7 mg/dL


(8.5-10.1) 


 


 





 


Magnesium Level


 1.6 mg/dL


(1.8-2.4) 


 


 





 


Total Bilirubin


 0.6 mg/dL


(0.2-1.0) 


 


 





 


Aspartate Amino Transf


(AST/SGOT) 38 U/L (15-37) 


 


 


 





 


Alanine Aminotransferase


(ALT/SGPT) 29 U/L (14-59) 


 


 


 





 


Alkaline Phosphatase


 104 U/L


() 


 


 





 


Total Protein


 6.9 g/dL


(6.4-8.2) 


 


 





 


Albumin


 3.6 g/dL


(3.4-5.0) 


 


 





 


Albumin/Globulin Ratio 1.1 (1.0-1.7)    


 


Glucose (Fingerstick)


 


 134 mg/dL


(70-99) 124 mg/dL


(70-99) 78 mg/dL


(70-99)











Comment


Review of Relevant


I have reviewed the following items lala (where applicable) has been applied.


Labs





Laboratory Tests








Test


 4/20/19


13:00 4/20/19


17:00 4/20/19


21:44 4/21/19


08:10


 


White Blood Count


 13.4 x10^3/uL


(4.0-11.0) 


 


 





 


Red Blood Count


 3.95 x10^6/uL


(3.50-5.40) 


 


 





 


Hemoglobin


 11.6 g/dL


(12.0-15.5) 


 


 





 


Hematocrit


 37.0 %


(36.0-47.0) 


 


 





 


Mean Corpuscular Volume 94 fL ()    


 


Mean Corpuscular Hemoglobin 30 pg (25-35)    


 


Mean Corpuscular Hemoglobin


Concent 31 g/dL


(31-37) 


 


 





 


Red Cell Distribution Width


 15.2 %


(11.5-14.5) 


 


 





 


Platelet Count


 236 x10^3/uL


(140-400) 


 


 





 


Neutrophils (%) (Auto) 75 % (31-73)    


 


Lymphocytes (%) (Auto) 12 % (24-48)    


 


Monocytes (%) (Auto) 13 % (0-9)    


 


Eosinophils (%) (Auto) 0 % (0-3)    


 


Basophils (%) (Auto) 1 % (0-3)    


 


Neutrophils # (Auto)


 10.0 x10^3uL


(1.8-7.7) 


 


 





 


Lymphocytes # (Auto)


 1.6 x10^3/uL


(1.0-4.8) 


 


 





 


Monocytes # (Auto)


 1.7 x10^3/uL


(0.0-1.1) 


 


 





 


Eosinophils # (Auto)


 0.0 x10^3/uL


(0.0-0.7) 


 


 





 


Basophils # (Auto)


 0.1 x10^3/uL


(0.0-0.2) 


 


 





 


Sodium Level


 137 mmol/L


(136-145) 


 


 





 


Potassium Level


 4.8 mmol/L


(3.5-5.1) 


 


 





 


Chloride Level


 101 mmol/L


() 


 


 





 


Carbon Dioxide Level


 28 mmol/L


(21-32) 


 


 





 


Anion Gap 8 (6-14)    


 


Blood Urea Nitrogen


 14 mg/dL


(7-20) 


 


 





 


Creatinine


 1.4 mg/dL


(0.6-1.0) 


 


 





 


Estimated GFR


(Cockcroft-Gault) 36.1 


 


 


 





 


BUN/Creatinine Ratio 10 (6-20)    


 


Glucose Level


 143 mg/dL


(70-99) 


 


 





 


Calcium Level


 9.7 mg/dL


(8.5-10.1) 


 


 





 


Magnesium Level


 1.6 mg/dL


(1.8-2.4) 


 


 





 


Total Bilirubin


 0.6 mg/dL


(0.2-1.0) 


 


 





 


Aspartate Amino Transf


(AST/SGOT) 38 U/L (15-37) 


 


 


 





 


Alanine Aminotransferase


(ALT/SGPT) 29 U/L (14-59) 


 


 


 





 


Alkaline Phosphatase


 104 U/L


() 


 


 





 


Total Protein


 6.9 g/dL


(6.4-8.2) 


 


 





 


Albumin


 3.6 g/dL


(3.4-5.0) 


 


 





 


Albumin/Globulin Ratio 1.1 (1.0-1.7)    


 


Glucose (Fingerstick)


 


 134 mg/dL


(70-99) 124 mg/dL


(70-99) 78 mg/dL


(70-99)








Laboratory Tests








Test


 4/20/19


13:00 4/20/19


17:00 4/20/19


21:44 4/21/19


08:10


 


White Blood Count


 13.4 x10^3/uL


(4.0-11.0) 


 


 





 


Red Blood Count


 3.95 x10^6/uL


(3.50-5.40) 


 


 





 


Hemoglobin


 11.6 g/dL


(12.0-15.5) 


 


 





 


Hematocrit


 37.0 %


(36.0-47.0) 


 


 





 


Mean Corpuscular Volume 94 fL ()    


 


Mean Corpuscular Hemoglobin 30 pg (25-35)    


 


Mean Corpuscular Hemoglobin


Concent 31 g/dL


(31-37) 


 


 





 


Red Cell Distribution Width


 15.2 %


(11.5-14.5) 


 


 





 


Platelet Count


 236 x10^3/uL


(140-400) 


 


 





 


Neutrophils (%) (Auto) 75 % (31-73)    


 


Lymphocytes (%) (Auto) 12 % (24-48)    


 


Monocytes (%) (Auto) 13 % (0-9)    


 


Eosinophils (%) (Auto) 0 % (0-3)    


 


Basophils (%) (Auto) 1 % (0-3)    


 


Neutrophils # (Auto)


 10.0 x10^3uL


(1.8-7.7) 


 


 





 


Lymphocytes # (Auto)


 1.6 x10^3/uL


(1.0-4.8) 


 


 





 


Monocytes # (Auto)


 1.7 x10^3/uL


(0.0-1.1) 


 


 





 


Eosinophils # (Auto)


 0.0 x10^3/uL


(0.0-0.7) 


 


 





 


Basophils # (Auto)


 0.1 x10^3/uL


(0.0-0.2) 


 


 





 


Sodium Level


 137 mmol/L


(136-145) 


 


 





 


Potassium Level


 4.8 mmol/L


(3.5-5.1) 


 


 





 


Chloride Level


 101 mmol/L


() 


 


 





 


Carbon Dioxide Level


 28 mmol/L


(21-32) 


 


 





 


Anion Gap 8 (6-14)    


 


Blood Urea Nitrogen


 14 mg/dL


(7-20) 


 


 





 


Creatinine


 1.4 mg/dL


(0.6-1.0) 


 


 





 


Estimated GFR


(Cockcroft-Gault) 36.1 


 


 


 





 


BUN/Creatinine Ratio 10 (6-20)    


 


Glucose Level


 143 mg/dL


(70-99) 


 


 





 


Calcium Level


 9.7 mg/dL


(8.5-10.1) 


 


 





 


Magnesium Level


 1.6 mg/dL


(1.8-2.4) 


 


 





 


Total Bilirubin


 0.6 mg/dL


(0.2-1.0) 


 


 





 


Aspartate Amino Transf


(AST/SGOT) 38 U/L (15-37) 


 


 


 





 


Alanine Aminotransferase


(ALT/SGPT) 29 U/L (14-59) 


 


 


 





 


Alkaline Phosphatase


 104 U/L


() 


 


 





 


Total Protein


 6.9 g/dL


(6.4-8.2) 


 


 





 


Albumin


 3.6 g/dL


(3.4-5.0) 


 


 





 


Albumin/Globulin Ratio 1.1 (1.0-1.7)    


 


Glucose (Fingerstick)


 


 134 mg/dL


(70-99) 124 mg/dL


(70-99) 78 mg/dL


(70-99)








Medications





Current Medications


Ondansetron HCl (Zofran) 4 mg PRN Q6HRS  PRN IV NAUSEA/VOMITING;  Start 4/19/19 

at 07:00;  Stop 4/20/19 at 06:59;  Status DC


Fentanyl Citrate (Fentanyl 2ml Vial) 25 mcg PRN Q5MIN  PRN IV MILD PAIN;  Start 

4/19/19 at 07:00;  Stop 4/20/19 at 06:59;  Status DC


Fentanyl Citrate (Fentanyl 2ml Vial) 50 mcg PRN Q5MIN  PRN IV MODERATE TO 

SEVERE PAIN Last administered on 4/19/19at 09:26;  Start 4/19/19 at 07:00;  

Stop 4/20/19 at 06:59;  Status DC


Morphine Sulfate (Morphine Sulfate) 1 mg PRN Q10MIN  PRN IV SEVERE PAIN;  Start 

4/19/19 at 07:00;  Stop 4/20/19 at 06:59;  Status DC


Ringer's Solution 1,000 ml @  30 mls/hr Q24H IV  Last administered on 4/19/19at 

07:16;  Start 4/19/19 at 07:00;  Stop 4/19/19 at 18:59;  Status DC


Hydromorphone HCl (Dilaudid) 0.5 mg PRN Q10MIN  PRN IV SEV PAIN, Second choice;

  Start 4/19/19 at 07:00;  Stop 4/20/19 at 06:59;  Status DC


Prochlorperazine Edisylate (Compazine) 5 mg PACU PRN  PRN IV NAUSEA, MRX1;  

Start 4/19/19 at 07:00;  Stop 4/20/19 at 06:59;  Status DC


Levofloxacin/ Dextrose 100 ml @  100 mls/hr 1X PREOP  PRN IV PRIOR TO PROCEDURE 

Last administered on 4/19/19at 07:49;  Start 4/19/19 at 06:00;  Stop 4/19/19 at 

18:00;  Status DC


Bupivacaine HCl/ Epinephrine Bitart (Sensorcain-Mpf Epi 0.5%-1:881467) 30 ml STK

-MED ONCE .ROUTE  Last administered on 4/19/19at 08:15;  Start 4/19/19 at 05:52

;  Stop 4/19/19 at 06:52;  Status DC


Iohexol (Omnipaque 300 Mg/ml) 100 ml STK-MED ONCE .ROUTE ;  Start 4/19/19 at 05:

52;  Stop 4/19/19 at 06:52;  Status DC


Cellulose (Surgicel Hemostat 4x8) 1 each STK-MED ONCE .ROUTE ;  Start 4/19/19 

at 05:52;  Stop 4/19/19 at 06:53;  Status DC


Propofol 20 ml @ As Directed STK-MED ONCE IV ;  Start 4/19/19 at 07:35;  Stop 4/ 19/19 at 07:36;  Status DC


Dexamethasone Sodium Phosphate (Decadron) 20 mg STK-MED ONCE .ROUTE ;  Start 4/ 19/19 at 07:35;  Stop 4/19/19 at 07:36;  Status DC


Lidocaine HCl (Lidocaine Pf 2% Vial) 5 ml STK-MED ONCE .ROUTE ;  Start 4/19/19 

at 07:35;  Stop 4/19/19 at 07:36;  Status DC


Ondansetron HCl (Zofran) 4 mg STK-MED ONCE .ROUTE ;  Start 4/19/19 at 07:35;  

Stop 4/19/19 at 07:36;  Status DC


Rocuronium Bromide (Zemuron) 50 mg STK-MED ONCE .ROUTE ;  Start 4/19/19 at 07:36

;  Stop 4/19/19 at 07:37;  Status DC


Fentanyl Citrate (Fentanyl 2ml Vial) 100 mcg STK-MED ONCE .ROUTE ;  Start 4/19/ 19 at 07:36;  Stop 4/19/19 at 07:37;  Status DC


Glycopyrrolate (Robinul) 1 mg STK-MED ONCE .ROUTE ;  Start 4/19/19 at 08:23;  

Stop 4/19/19 at 08:24;  Status DC


Phenylephrine HCl (PHENYLEPHRINE in 0.9% NACL PF) 1 mg STK-MED ONCE IV ;  Start 

4/19/19 at 08:24;  Stop 4/19/19 at 08:25;  Status DC


Neostigmine Methylsulfate (Neostigmine Methylsulfate) 5 mg STK-MED ONCE .ROUTE 

;  Start 4/19/19 at 08:36;  Stop 4/19/19 at 08:37;  Status DC


Sodium Chloride (Normal Saline Flush) 3 ml QSHIFT  PRN IV AFTER MEDS AND BLOOD 

DRAWS;  Start 4/19/19 at 09:00


Morphine Sulfate (Morphine Sulfate) 2 mg PRN Q3HRS  PRN IV PAIN;  Start 4/19/19 

at 09:00


Oxycodone/ Acetaminophen (Percocet 5/325) 1 tab PRN Q4HRS  PRN PO MILD PAIN, 

1ST CHOICE;  Start 4/19/19 at 09:00


Oxycodone/ Acetaminophen (Percocet 5/325) 2 tab PRN Q4HRS  PRN PO MODERATE PAIN

, SEVERE PAIN;  Start 4/19/19 at 09:00


Ondansetron HCl (Zofran) 4 mg PRN Q6HRS  PRN IV NAUSEA, 1ST CHOICE Last 

administered on 4/19/19at 10:29;  Start 4/19/19 at 09:00


Dextrose/Lactated Ringer's 1,000 ml @  75 mls/hr Q23W15T IV  Last administered 

on 4/19/19at 19:41;  Start 4/19/19 at 08:46;  Stop 4/20/19 at 19:12;  Status DC


Sevoflurane (Ultane) 30 ml STK-MED ONCE IH ;  Start 4/19/19 at 08:51;  Stop 4/19 /19 at 08:52;  Status DC


Fentanyl Citrate (Fentanyl 2ml Vial) 100 mcg STK-MED ONCE .ROUTE ;  Start 4/19/ 19 at 09:06;  Stop 4/19/19 at 09:07;  Status DC


Prochlorperazine Edisylate (Compazine) 10 mg 1X  ONCE IV  Last administered on 4 /19/19at 12:45;  Start 4/19/19 at 12:45;  Stop 4/19/19 at 12:46;  Status DC


Magnesium Sulfate 50 ml @ 25 mls/hr 1X  ONCE IV  Last administered on 4/20/19at 

16:34;  Start 4/20/19 at 14:30;  Stop 4/20/19 at 16:29;  Status DC


Sodium Chloride 1,000 ml @  125 mls/hr Q8H IV  Last administered on 4/20/19at 16

:36;  Start 4/20/19 at 14:15;  Stop 4/20/19 at 19:12;  Status DC


Polyethylene Glycol (miraLAX PACKET) 17 gm BID  PRN PO CONSTIPATION;  Start 4/20 /19 at 14:45


Docusate Sodium (Colace) 100 mg BID PO  Last administered on 4/20/19at 21:14;  

Start 4/20/19 at 14:45


Magnesium Hydroxide (Milk Of Magnesia) 2,400 mg 1X  ONCE PO ;  Start 4/20/19 at 

15:30;  Stop 4/20/19 at 15:31;  Status DC


Ascorbic Acid (Vitamin C) 500 mg DAILY PO ;  Start 4/20/19 at 16:00


Aspirin (Ecotrin) 325 mg PRN DAILY  PRN PO PAIN;  Start 4/20/19 at 15:15


Bupropion HCl (Wellbutrin Sr) 100 mg DAILY PO ;  Start 4/20/19 at 16:00


Cyanocobalamin (Vitamin B-12) 1,000 mcg DAILY PO ;  Start 4/20/19 at 16:00


Ferrous Sulfate (Feosol) 325 mg BIDWMEALS PO  Last administered on 4/20/19at 16:

35;  Start 4/20/19 at 17:00


Vitamin D (Vitamin D3) 5,000 unit DAILY PO ;  Start 4/20/19 at 16:00


Levothyroxine Sodium (Synthroid) 25 mcg DAILY06 PO  Last administered on 4/21/ 19at 05:48;  Start 4/21/19 at 06:00


Pantoprazole Sodium (Protonix) 40 mg DAILYAC PO ;  Start 4/21/19 at 07:30


Insulin Human Lispro (HumaLOG) 0-5 UNITS TIDWMEALS SQ ;  Start 4/20/19 at 17:00


Dextrose (Dextrose 50%-Water Syringe) 12.5 gm PRN Q15MIN  PRN IV SEE COMMENTS;  

Start 4/20/19 at 15:15


Heparin Sodium (Porcine) (Heparin Sodium) 5,000 unit Q8HRS SQ  Last 

administered on 4/21/19at 05:52;  Start 4/20/19 at 16:00





Active Scripts


Active


Reported


Vitamin C (Ascorbic Acid) 500 Mg Tablet 500 Mg PO DAILY


Vitamin D3 (Cholecalciferol (Vitamin D3)) 5,000 Unit Tablet 5,000 Unit PO DAILY


Vitamin B-12 (Cyanocobalamin (Vitamin B-12)) 1,000 Mcg Tablet 1,000 Mcg PO DAILY


Ferrous Sulfate 325 Mg Tablet 325 Mg PO BID


Omeprazole 40 Mg Capsule.dr 40 Mg PO DAILY


Prednisolone Acetate 5 Ml Drops.susp 5 Ml OP BID


Aspirin Ec (Aspirin) 325 Mg Tablet.dr 325 Mg PO PRN DAILY PRN


Synthroid (Levothyroxine Sodium) 25 Mcg Tablet 1 Tab PO DAILY


Cyclopentolate Hcl 2 Ml Drops   


Bupropion Hcl Sr (Bupropion Hcl) 100 Mg Tablet.er   DAILY


Vitals/I & O





Vital Sign - Last 24 Hours








 4/20/19 4/20/19 4/20/19 4/20/19





 11:00 12:35 12:40 13:25


 


Temp 97.6   97.6





 97.6   97.6


 


Pulse 73 68 68 90


 


Resp 18   20


 


B/P (MAP) 102/40 (60) 58/30 (39) 58/25 (36) 111/56 (74)


 


Pulse Ox 96   93


 


O2 Delivery Room Air   Room Air


 


    





    





 4/20/19 4/20/19 4/20/19 4/20/19





 15:31 19:25 19:59 20:16


 


Temp 98.3   98.8





 98.3   98.8


 


Pulse 95   92


 


Resp 18   18


 


B/P (MAP) 129/61 (83)   129/60 (83)


 


Pulse Ox    93


 


O2 Delivery Room Air Room Air Room Air Room Air


 


    





    





 4/20/19 4/21/19 4/21/19 4/21/19





 22:29 03:59 04:10 07:20


 


Temp 99.3  98.4 98.8





 99.3  98.4 98.8


 


Pulse 97  90 83


 


Resp 19  18 


 


B/P (MAP) 134/61 (85)  125/58 (80) 135/61 (85)


 


Pulse Ox 94  91 92


 


O2 Delivery Room Air Room Air Room Air Room Air


 


    





    





 4/21/19   





 07:45   


 


O2 Delivery Room Air   














Intake and Output   


 


 4/20/19 4/20/19 4/21/19





 15:00 23:00 07:00


 


Intake Total  600 ml 1045 ml


 


Balance  600 ml 1045 ml

















STACIE CALERO MD Apr 21, 2019 08:52

## 2019-04-21 NOTE — NUR
Pt discharged to home.  x4 sites GAETANO with steri-strips intact, no drainage or redness noted. 
 ALl discharge orders reviewed with pt, states understanding.  Follow-up appointments 
scheduled.  Pt denies further needs.  PT assisted to car via wheelchair with staff and 
family.

## 2019-04-21 NOTE — PDOC
PROGRESS NOTES


Assessment


Assessment


Syncope.


Hypotension, BP 58/25 mmHg.


Seizure not likely.


Abdominal pain. 


Cholecystitis s/p cholecystectomy.


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


Valvular vegetations.


Diffuse atheromatous aortic calcification.


Mental cell lymphoma.


S/p splenectomy.


HLD


Left eye blindness, chronic.





RECOMMENDATIONS/PLAN:


Treat medical and surgical diseases.


EEG as out-patient, but she declined it twice..


FU with PCP.


FU with Neurology if has seizure like episodes.





History of Present Illness


Ms Nunn is an 83-y-o  female patient with PMHx CHF, Hyperlipidemia

, carotid artery disease, TIA, constipation, GERD, anemia, h/o mantle cell 

Lymphoma, Hep B, anxiety, depression, chronic LBP, DM2, hypothyroidism, CKD, 

left eye blindness, osteopenia who was admitted for elective lap 

cholecystectomy from a home independent living facility where she lives with 

her . Today a code stroke was called when she was not responsive, found 

with blood pressure 50/25 at noon today, went for stat CT head, No acute 

intracranial process detected. She notes prior to the episode she saw stars 

after a PT session, which resolved on its own. She did not have LOC, shaking or 

nelia movements. No postictal state. She was responsive to a fluid bolus, 

has a BP of 111/56 mmHg.


She stated doing well on 4/21/19.





Past Medical History


Cardiovascular:  CHF, Syncope, Hyperlipidemia, Other


Pulmonary:  No pertinent hx


CENTRAL NERVOUS SYSTEM:  TIA, Other


GI:  Constipation, GERD


Heme/Onc:  Anemia NOS, Cancer


Hepatobiliary:  Hep A/B/C


Psych:  Anxiety, Depression, Schizophrenia


Musculoskeletal:   low back pain, Osteoarthritis, Other


Infectious disease:  No pertinent hx


Renal/:  Chronic renal insuff, UTI, Urinary Incontinence


Endocrine:  Diabetes, Hypothyroidism, Osteopenia





Past Surgical History


Cholecystectomy (4/19/19 - Dr. Ac), Total knee replacement, Hysterectomy, 

Other





Family History


No Significant





Social History


NoALCOHOL:  none


Drugs:  None


Lives:  with Family ( in independent adult living)





Allergies


Coded Allergies:  


Penicillins (Verified  Allergy, Severe, sob, 4/19/19). shellfish derived (

Verified  Allergy, Severe, SHORT OF BREATH, 4/19/19), egg (Verified  Allergy, 

Mild, NAUSEA/VOMITING, 4/19/19)





MEDICATIONS:


Refer to MAR





REVIEW OF SYSTEMS:


Constitutional: No malnutrition, weight loss


Head: No traumatic brain or head injury.


Skin: No edema, or rash.


Ear: No infection, tinnitus.


Eyes: No vision loss, color blindness


Nose: No bleeding or purulent discharges.


Neck: No injury, lymph note enlargement


Breast: No history of cancer, masses, discharges.


Cardiac: No MI, arrhythmia


Pulmonary: No hemoptysis, dyspnea


GI: No melena, hematochezia


Urinary/genital: UTI.


Endocrine: No symptoms of gigantism, dwarf, hyperphasia, cousin face, 

craniofacial dysmorphism, polydactyly, goiter


Skeletomuscular: No muscular atrophy, deformity


Neurological: see  HP.


Psychiatric: Denies drug use/abuse.


Otherwise, not pertinant14-point review of systems.





PHYSICAL EXAMINATION:   


General appearance in no acute distress.  


HEENT:  Normocephalic and nontraumatic.  Eyes, nose, ears, and throat are 

unremarkable.  


Neck is supple. No lymphadenopathy. No bruits are heard over the carotid 

artery.  


Cardiovascular:  S1, S2, regular rate and rhythm.  


Pulmonary:  Clear to auscultation bilaterally. 


Abdomen:  Bowel sounds are positive.   


Extremities:  No rash, lesions, or edema.  


No restriction of range of motion





NEUROLOGICAL  EXAMINATION:


Awake.


Oriented to time, place and person.


Right pupil 2 mm reactive to light. Left pupil cloudy no reaction to light 

stimuli.


EOMI.


CN: no focal findings.


Muscle tone: within normal.


Muscle strength: 5


DTR: 2 at UE, 1 at knee s/p knee surgery.


Plantar reflex: flexor response bilaterally 


Gait: At baseline normal.


Sensory exam: No abnormal findings.


No cerebellar signs elicited.





Objective


Objective





Vital Signs








  Date Time  Temp Pulse Resp B/P (MAP) Pulse Ox O2 Delivery O2 Flow Rate FiO2


 


4/21/19 07:45      Room Air  


 


4/21/19 07:20 98.8 83  135/61 (85) 92   





 98.8       


 


4/21/19 04:10   18     


 


4/20/19 07:00       2.0 














Intake and Output 


 


 4/21/19





 07:00


 


Intake Total 1645 ml


 


Balance 1645 ml


 


 


 


Intake Oral 600 ml


 


IV Total 1045 ml


 


# Voids 5











Vitals Signs


Vitals





 VS - Last 72 Hours, by Label








  Date Time  Temp Pulse Resp B/P (MAP) Pulse Ox O2 Delivery O2 Flow Rate FiO2


 


4/21/19 07:45      Room Air  


 


4/21/19 07:20 98.8 83  135/61 (85) 92 Room Air  





 98.8       


 


4/21/19 04:10 98.4 90 18 125/58 (80) 91 Room Air  





 98.4       


 


4/21/19 03:59      Room Air  


 


4/20/19 22:29 99.3 97 19 134/61 (85) 94 Room Air  





 99.3       


 


4/20/19 20:16 98.8 92 18 129/60 (83) 93 Room Air  





 98.8       


 


4/20/19 19:59      Room Air  


 


4/20/19 19:25      Room Air  


 


4/20/19 15:31 98.3 95 18 129/61 (83)  Room Air  





 98.3       


 


4/20/19 13:25 97.6 90 20 111/56 (74) 93 Room Air  





 97.6       


 


4/20/19 12:40  68  58/25 (36)    


 


4/20/19 12:35  68  58/30 (39)    


 


4/20/19 11:00 97.6 73 18 102/40 (60) 96 Room Air  





 97.6       


 


4/20/19 08:00      Room Air  


 


4/20/19 07:00 98.0 80 18 121/54 (76) 96 Nasal Cannula 2.0 





 98.0       











Laboratory


Laboratory





Laboratory Tests








Test


 4/20/19


13:00 4/20/19


17:00 4/20/19


21:44 4/21/19


08:10


 


White Blood Count


 13.4 x10^3/uL


(4.0-11.0) 


 


 





 


Red Blood Count


 3.95 x10^6/uL


(3.50-5.40) 


 


 





 


Hemoglobin


 11.6 g/dL


(12.0-15.5) 


 


 





 


Hematocrit


 37.0 %


(36.0-47.0) 


 


 





 


Mean Corpuscular Volume 94 fL ()    


 


Mean Corpuscular Hemoglobin 30 pg (25-35)    


 


Mean Corpuscular Hemoglobin


Concent 31 g/dL


(31-37) 


 


 





 


Red Cell Distribution Width


 15.2 %


(11.5-14.5) 


 


 





 


Platelet Count


 236 x10^3/uL


(140-400) 


 


 





 


Neutrophils (%) (Auto) 75 % (31-73)    


 


Lymphocytes (%) (Auto) 12 % (24-48)    


 


Monocytes (%) (Auto) 13 % (0-9)    


 


Eosinophils (%) (Auto) 0 % (0-3)    


 


Basophils (%) (Auto) 1 % (0-3)    


 


Neutrophils # (Auto)


 10.0 x10^3uL


(1.8-7.7) 


 


 





 


Lymphocytes # (Auto)


 1.6 x10^3/uL


(1.0-4.8) 


 


 





 


Monocytes # (Auto)


 1.7 x10^3/uL


(0.0-1.1) 


 


 





 


Eosinophils # (Auto)


 0.0 x10^3/uL


(0.0-0.7) 


 


 





 


Basophils # (Auto)


 0.1 x10^3/uL


(0.0-0.2) 


 


 





 


Sodium Level


 137 mmol/L


(136-145) 


 


 





 


Potassium Level


 4.8 mmol/L


(3.5-5.1) 


 


 





 


Chloride Level


 101 mmol/L


() 


 


 





 


Carbon Dioxide Level


 28 mmol/L


(21-32) 


 


 





 


Anion Gap 8 (6-14)    


 


Blood Urea Nitrogen


 14 mg/dL


(7-20) 


 


 





 


Creatinine


 1.4 mg/dL


(0.6-1.0) 


 


 





 


Estimated GFR


(Cockcroft-Gault) 36.1 


 


 


 





 


BUN/Creatinine Ratio 10 (6-20)    


 


Glucose Level


 143 mg/dL


(70-99) 


 


 





 


Calcium Level


 9.7 mg/dL


(8.5-10.1) 


 


 





 


Magnesium Level


 1.6 mg/dL


(1.8-2.4) 


 


 





 


Total Bilirubin


 0.6 mg/dL


(0.2-1.0) 


 


 





 


Aspartate Amino Transf


(AST/SGOT) 38 U/L (15-37) 


 


 


 





 


Alanine Aminotransferase


(ALT/SGPT) 29 U/L (14-59) 


 


 


 





 


Alkaline Phosphatase


 104 U/L


() 


 


 





 


Total Protein


 6.9 g/dL


(6.4-8.2) 


 


 





 


Albumin


 3.6 g/dL


(3.4-5.0) 


 


 





 


Albumin/Globulin Ratio 1.1 (1.0-1.7)    


 


Glucose (Fingerstick)


 


 134 mg/dL


(70-99) 124 mg/dL


(70-99) 78 mg/dL


(70-99)


 


Test


 4/21/19


10:15 


 


 





 


White Blood Count


 8.2 x10^3/uL


(4.0-11.0) 


 


 





 


Red Blood Count


 3.83 x10^6/uL


(3.50-5.40) 


 


 





 


Hemoglobin


 11.7 g/dL


(12.0-15.5) 


 


 





 


Hematocrit


 37.8 %


(36.0-47.0) 


 


 





 


Mean Corpuscular Volume 99 fL ()    


 


Mean Corpuscular Hemoglobin 31 pg (25-35)    


 


Mean Corpuscular Hemoglobin


Concent 31 g/dL


(31-37) 


 


 





 


Red Cell Distribution Width


 16.5 %


(11.5-14.5) 


 


 





 


Platelet Count


 182 x10^3/uL


(140-400) 


 


 





 


Neutrophils (%) (Auto) 58 % (31-73)    


 


Lymphocytes (%) (Auto) 26 % (24-48)    


 


Monocytes (%) (Auto) 12 % (0-9)    


 


Eosinophils (%) (Auto) 3 % (0-3)    


 


Basophils (%) (Auto) 1 % (0-3)    


 


Neutrophils # (Auto)


 4.7 x10^3uL


(1.8-7.7) 


 


 





 


Lymphocytes # (Auto)


 2.1 x10^3/uL


(1.0-4.8) 


 


 





 


Monocytes # (Auto)


 0.9 x10^3/uL


(0.0-1.1) 


 


 





 


Eosinophils # (Auto)


 0.3 x10^3/uL


(0.0-0.7) 


 


 





 


Basophils # (Auto)


 0.1 x10^3/uL


(0.0-0.2) 


 


 





 


Sodium Level


 133 mmol/L


(136-145) 


 


 





 


Potassium Level


 3.8 mmol/L


(3.5-5.1) 


 


 





 


Chloride Level


 103 mmol/L


() 


 


 





 


Carbon Dioxide Level


 22 mmol/L


(21-32) 


 


 





 


Anion Gap 8 (6-14)    


 


Blood Urea Nitrogen


 15 mg/dL


(7-20) 


 


 





 


Creatinine


 1.0 mg/dL


(0.6-1.0) 


 


 





 


Estimated GFR


(Cockcroft-Gault) 53.2 


 


 


 





 


Glucose Level


 155 mg/dL


(70-99) 


 


 





 


Calcium Level


 8.9 mg/dL


(8.5-10.1) 


 


 





 


Magnesium Level


 2.0 mg/dL


(1.8-2.4) 


 


 














Medication


Medications





Current Medications


Ascorbic Acid (Vitamin C) 500 mg DAILY PO  Last administered on 4/21/19at 08:52

;  Start 4/20/19 at 16:00


Aspirin (Ecotrin) 325 mg PRN DAILY  PRN PO PAIN;  Start 4/20/19 at 15:15


Bupropion HCl (Wellbutrin Sr) 100 mg DAILY PO  Last administered on 4/21/19at 08

:52;  Start 4/20/19 at 16:00


Cyanocobalamin (Vitamin B-12) 1,000 mcg DAILY PO  Last administered on 4/21/ 19at 08:52;  Start 4/20/19 at 16:00


Dextrose (Dextrose 50%-Water Syringe) 12.5 gm PRN Q15MIN  PRN IV SEE COMMENTS;  

Start 4/20/19 at 15:15


Docusate Sodium (Colace) 100 mg BID PO  Last administered on 4/21/19at 08:52;  

Start 4/20/19 at 14:45


Ferrous Sulfate (Feosol) 325 mg BIDWMEALS PO  Last administered on 4/21/19at 08:

52;  Start 4/20/19 at 17:00


Heparin Sodium (Porcine) (Heparin Sodium) 5,000 unit Q8HRS SQ  Last 

administered on 4/21/19at 05:52;  Start 4/20/19 at 16:00


Insulin Human Lispro (HumaLOG) 0-5 UNITS TIDWMEALS SQ ;  Start 4/20/19 at 17:00


Levothyroxine Sodium (Synthroid) 25 mcg DAILY06 PO  Last administered on 4/21/ 19at 05:48;  Start 4/21/19 at 06:00


Magnesium Hydroxide (Milk Of Magnesia) 2,400 mg 1X  ONCE PO ;  Start 4/20/19 at 

15:30;  Stop 4/20/19 at 15:31;  Status DC


Magnesium Sulfate 50 ml @ 25 mls/hr 1X  ONCE IV  Last administered on 4/20/19at 

16:34;  Start 4/20/19 at 14:30;  Stop 4/20/19 at 16:29;  Status DC


Pantoprazole Sodium (Protonix) 40 mg DAILYAC PO  Last administered on 4/21/19at 

08:52;  Start 4/21/19 at 07:30


Polyethylene Glycol (miraLAX PACKET) 17 gm BID  PRN PO CONSTIPATION;  Start 4/20 /19 at 14:45


Sodium Chloride 1,000 ml @  125 mls/hr Q8H IV  Last administered on 4/20/19at 16

:36;  Start 4/20/19 at 14:15;  Stop 4/20/19 at 19:12;  Status DC


Vitamin D (Vitamin D3) 5,000 unit DAILY PO  Last administered on 4/21/19at 08:52

;  Start 4/20/19 at 16:00





Comment


Review of Relevant


I have reviewed the following items lala (where applicable) has been applied.











DAIN RENEE MD Apr 21, 2019 12:14

## 2019-04-21 NOTE — PDOC2
CARDIOLOGY CONSULT NOTE


CHEIF COMPLAINT:


Passing out





HPI:


83-year-old woman well-known to our service from her office visits presented 

for a routine elective cholecystectomy. Procedure went well and prior to 

discharge she was sitting in her recliner in the room with her daughter and 

said that she felt some black spots appearing in her eyes. This led to her 

having a syncopal episode per her daughter according to chart review. Patient 

was noted to have a blood pressure 50/30. This resolved immediately after a 

bolus of IV fluids. She was then transferred to the telemetry unit and 

monitored overnight and has not had any acute issues.





This morning she's been sitting up in the bed and eating breakfast without any 

complaints. She has not had any recent chest pain, dyspnea, orthopnea, PND or 

lower extremity edema. Reports compliance with her home medications. She does 

endorse that she did not eat well on the day before this event occurred.





Of note she was admitted in 2017 with an episode of syncope which was treated 

to vasovagal episode in the setting of micturition at 3 in the morning.





PMHX:


1. Mild diastolic heart failure on oral diuretic therapy on an outpatient basis


2. Mild peripheral arterial disease without any significant obstructive carotid 

issues


3. Prior history of syncope with a negative workup.


4. Mild to moderate mitral regurgitation





SOCHX:


No alcohol, tobacco or illicit drug use





FAMHX:


Noncontributory





CURRENT MEDS:





Current Medications








 Medications


  (Trade)  Dose


 Ordered  Sig/Anastasia  Start Time


 Stop Time Status Last Admin


Dose Admin


 


 Ascorbic Acid


  (Vitamin C)  500 mg  DAILY  4/20/19 16:00


     





 


 Aspirin


  (Ecotrin)  325 mg  PRN DAILY  PRN  4/20/19 15:15


     





 


 Bupivacaine HCl/


 Epinephrine Bitart


  (Sensorcain-Mpf


 Epi 0.5%-[id number])  30 ml  STK-MED ONCE  4/19/19 05:52


 4/19/19 06:52 DC 4/19/19 08:15


17 ML


 


 Bupropion HCl


  (Wellbutrin Sr)  100 mg  DAILY  4/20/19 16:00


     





 


 Cellulose


  (Surgicel


 Hemostat 4x8)  1 each  STK-MED ONCE  4/19/19 05:52


 4/19/19 06:53 DC  





 


 Cyanocobalamin


  (Vitamin B-12)  1,000 mcg  DAILY  4/20/19 16:00


     





 


 Dexamethasone


 Sodium Phosphate


  (Decadron)  20 mg  STK-MED ONCE  4/19/19 07:35


 4/19/19 07:36 DC  





 


 Dextrose


  (Dextrose


 50%-Water Syringe)  12.5 gm  PRN Q15MIN  PRN  4/20/19 15:15


     





 


 Dextrose/Lactated


 Ringer's  1,000 ml @ 


 75 mls/hr  [id number]  4/19/19 08:46


 4/20/19 19:12 DC 4/19/19 19:41


75 MLS/HR


 


 Docusate Sodium


  (Colace)  100 mg  BID  4/20/19 14:45


    4/20/19 21:14


100 MG


 


 Fentanyl Citrate


  (Fentanyl 2ml


 Vial)  100 mcg  STK-MED ONCE  4/19/19 09:06


 4/19/19 09:07 DC  





 


 Ferrous Sulfate


  (Feosol)  325 mg  BIDWMEALS  4/20/19 17:00


    4/20/19 16:35


325 MG


 


 Glycopyrrolate


  (Robinul)  1 mg  STK-MED ONCE  4/19/19 08:23


 4/19/19 08:24 DC  





 


 Heparin Sodium


  (Porcine)


  (Heparin Sodium)  5,000 unit  Q8HRS  4/20/19 16:00


    4/21/19 05:52


5,000 UNIT


 


 Hydromorphone HCl


  (Dilaudid)  0.5 mg  PRN Q10MIN  PRN  4/19/19 07:00


 4/20/19 06:59 DC  





 


 Insulin Human


 Lispro


  (HumaLOG)  0-5 UNITS  TIDWMEALS  4/20/19 17:00


     





 


 Iohexol


  (Omnipaque 300


 Mg/ml)  100 ml  STK-MED ONCE  4/19/19 05:52


 4/19/19 06:52 DC  





 


 Levofloxacin/


 Dextrose  100 ml @ 


 100 mls/hr  1X PREOP  PRN  4/19/19 06:00


 4/19/19 18:00 DC 4/19/19 07:49


100 MLS/HR


 


 Levothyroxine


 Sodium


  (Synthroid)  25 mcg  DAILY06  4/21/19 06:00


    4/21/19 05:48


25 MCG


 


 Lidocaine HCl


  (Lidocaine Pf 2%


 Vial)  5 ml  STK-MED ONCE  4/19/19 07:35


 4/19/19 07:36 DC  





 


 Magnesium


 Hydroxide


  (Milk Of


 Magnesia)  2,400 mg  1X  ONCE  4/20/19 15:30


 4/20/19 15:31 DC  





 


 Magnesium Sulfate  50 ml @ 25


 mls/hr  1X  ONCE  4/20/19 14:30


 4/20/19 16:29 DC 4/20/19 16:34


25 MLS/HR


 


 Morphine Sulfate


  (Morphine


 Sulfate)  2 mg  PRN Q3HRS  PRN  4/19/19 09:00


     





 


 Neostigmine


 Methylsulfate


  (Neostigmine


 Methylsulfate)  5 mg  STK-MED ONCE  4/19/19 08:36


 4/19/19 08:37 DC  





 


 Ondansetron HCl


  (Zofran)  4 mg  PRN Q6HRS  PRN  4/19/19 09:00


    4/19/19 10:29


4 MG


 


 Oxycodone/


 Acetaminophen


  (Percocet 5/325)  2 tab  PRN Q4HRS  PRN  4/19/19 09:00


     





 


 Pantoprazole


 Sodium


  (Protonix)  40 mg  DAILYAC  4/21/19 07:30


     





 


 Phenylephrine HCl


  (PHENYLEPHRINE


 in 0.9% NACL PF)  1 mg  STK-MED ONCE  4/19/19 08:24


 4/19/19 08:25 DC  





 


 Polyethylene


 Glycol


  (miraLAX PACKET)  17 gm  BID  PRN  4/20/19 14:45


     





 


 Prochlorperazine


 Edisylate


  (Compazine)  10 mg  1X  ONCE  4/19/19 12:45


 4/19/19 12:46 DC 4/19/19 12:45


10 MG


 


 Propofol  20 ml @ As


 Directed  STK-MED ONCE  4/19/19 07:35


 4/19/19 07:36 DC  





 


 Ringer's Solution  1,000 ml @ 


 30 mls/hr  Q24H  4/19/19 07:00


 4/19/19 18:59 DC 4/19/19 07:16


30 MLS/HR


 


 Rocuronium Bromide


  (Zemuron)  50 mg  STK-MED ONCE  4/19/19 07:36


 4/19/19 07:37 DC  





 


 Sevoflurane


  (Ultane)  30 ml  STK-MED ONCE  4/19/19 08:51


 4/19/19 08:52 DC  





 


 Sodium Chloride  1,000 ml @ 


 125 mls/hr  Q8H  4/20/19 14:15


 4/20/19 19:12 DC 4/20/19 16:36


125 MLS/HR


 


 Sodium Chloride


  (Normal Saline


 Flush)  3 ml  QSHIFT  PRN  4/19/19 09:00


     





 


 Vitamin D


  (Vitamin D3)  5,000 unit  DAILY  4/20/19 16:00


     














ALLERGIES:





Allergies








Coded Allergies Type Severity Reaction Last Updated Verified


 


  Penicillins Allergy Severe sob 4/19/19 Yes


 


  shellfish derived Allergy Severe SHORT OF BREATH 4/19/19 Yes


 


  egg Allergy Mild NAUSEA/VOMITING 4/19/19 Yes











ROS:


Negative for 10 out of 14 systems reviewed also otherwise mentioned above in 

history of present illness





PHYSICAL EXAM:


Vital Signs:





Vital Signs








  Date Time  Temp Pulse Resp B/P (MAP) Pulse Ox O2 Delivery O2 Flow Rate FiO2


 


4/21/19 07:45      Room Air  


 


4/21/19 07:20 98.8 83  135/61 (85) 92   





 98.8       


 


4/21/19 04:10   18     


 


4/20/19 07:00       2.0 








I & O











Intake and Output 


 


 4/21/19





 06:59


 


Intake Total 1645 ml


 


Balance 1645 ml


 


 


 


Intake Oral 600 ml


 


IV Total 1045 ml


 


# Voids 5








Physical Exam:


GEN.:    No apparent distress.  Alert and oriented.


HEENT:    Head is normocephalic, atraumatic


NECK:    Supple.  


LUNGS:    Clear to auscultation.


HEART:    RRR, S1, S2 present.  Peripheral pulses intact, soft 3/6 systolic 

murmur


ABDOMEN:    Soft, nontender.  Positive bowel sounds.


EXTREMITIES:    Without any cyanosis.    


NEUROLOGIC:     Normal speech, normal tone


PSYCHIATRIC:    Normal affect, normal mood.


SKIN:   No ulcerations





DIAGNOSTIC TESTING:


EKG, CT of the head, telemetry unremarkable


Creatinine minimally elevated at 1.4





ASSESSMENT:


1. Syncope: No obvious cardiac pathology noted, differential diagnosis could be 

arrhythmia versus dehydration and hypotension





PLAN:


1. Syncope -


-previous workup including a event monitor in 2017, recent echocardiogram in 

August 2018 do not reveal any significant cardiac pathology


In the setting of her postoperative state with pain meds and decreased food 

intake this is likely related to vaso-plegia currently she feels well. No 

obvious cardiac concerns.





No further cardiovascular evaluation necessary at this time. Okay to discharge. 

She will follow up in our office in 3-4 months.





Thanks











ZINA GARCIA MD Apr 21, 2019 08:45

## 2019-04-21 NOTE — PDOC
PROGRESS NOTES


Subjective


Subjective


pt feels well today, events of yesterday noted;  discussed with Dr Edwards,  

suspect vasovagal response,  improved, ready to go home





Objective


Objective





Vital Signs








  Date Time  Temp Pulse Resp B/P (MAP) Pulse Ox O2 Delivery O2 Flow Rate FiO2


 


4/21/19 07:45      Room Air  


 


4/21/19 07:20 98.8 83  135/61 (85) 92   





 98.8       


 


4/21/19 04:10   18     


 


4/20/19 07:00       2.0 














Intake and Output 


 


 4/21/19





 07:00


 


Intake Total 1645 ml


 


Balance 1645 ml


 


 


 


Intake Oral 600 ml


 


IV Total 1045 ml


 


# Voids 5











Physical Exam


Abdomen:  Soft, No tenderness





Plan


Plan of Care


Discharge





Comment


Review of Relevant


I have reviewed the following items lala (where applicable) has been applied.


Labs





Laboratory Tests








Test


 4/20/19


13:00 4/20/19


17:00 4/20/19


21:44 4/21/19


08:10


 


White Blood Count


 13.4 x10^3/uL


(4.0-11.0) 


 


 





 


Red Blood Count


 3.95 x10^6/uL


(3.50-5.40) 


 


 





 


Hemoglobin


 11.6 g/dL


(12.0-15.5) 


 


 





 


Hematocrit


 37.0 %


(36.0-47.0) 


 


 





 


Mean Corpuscular Volume 94 fL ()    


 


Mean Corpuscular Hemoglobin 30 pg (25-35)    


 


Mean Corpuscular Hemoglobin


Concent 31 g/dL


(31-37) 


 


 





 


Red Cell Distribution Width


 15.2 %


(11.5-14.5) 


 


 





 


Platelet Count


 236 x10^3/uL


(140-400) 


 


 





 


Neutrophils (%) (Auto) 75 % (31-73)    


 


Lymphocytes (%) (Auto) 12 % (24-48)    


 


Monocytes (%) (Auto) 13 % (0-9)    


 


Eosinophils (%) (Auto) 0 % (0-3)    


 


Basophils (%) (Auto) 1 % (0-3)    


 


Neutrophils # (Auto)


 10.0 x10^3uL


(1.8-7.7) 


 


 





 


Lymphocytes # (Auto)


 1.6 x10^3/uL


(1.0-4.8) 


 


 





 


Monocytes # (Auto)


 1.7 x10^3/uL


(0.0-1.1) 


 


 





 


Eosinophils # (Auto)


 0.0 x10^3/uL


(0.0-0.7) 


 


 





 


Basophils # (Auto)


 0.1 x10^3/uL


(0.0-0.2) 


 


 





 


Sodium Level


 137 mmol/L


(136-145) 


 


 





 


Potassium Level


 4.8 mmol/L


(3.5-5.1) 


 


 





 


Chloride Level


 101 mmol/L


() 


 


 





 


Carbon Dioxide Level


 28 mmol/L


(21-32) 


 


 





 


Anion Gap 8 (6-14)    


 


Blood Urea Nitrogen


 14 mg/dL


(7-20) 


 


 





 


Creatinine


 1.4 mg/dL


(0.6-1.0) 


 


 





 


Estimated GFR


(Cockcroft-Gault) 36.1 


 


 


 





 


BUN/Creatinine Ratio 10 (6-20)    


 


Glucose Level


 143 mg/dL


(70-99) 


 


 





 


Calcium Level


 9.7 mg/dL


(8.5-10.1) 


 


 





 


Magnesium Level


 1.6 mg/dL


(1.8-2.4) 


 


 





 


Total Bilirubin


 0.6 mg/dL


(0.2-1.0) 


 


 





 


Aspartate Amino Transf


(AST/SGOT) 38 U/L (15-37) 


 


 


 





 


Alanine Aminotransferase


(ALT/SGPT) 29 U/L (14-59) 


 


 


 





 


Alkaline Phosphatase


 104 U/L


() 


 


 





 


Total Protein


 6.9 g/dL


(6.4-8.2) 


 


 





 


Albumin


 3.6 g/dL


(3.4-5.0) 


 


 





 


Albumin/Globulin Ratio 1.1 (1.0-1.7)    


 


Glucose (Fingerstick)


 


 134 mg/dL


(70-99) 124 mg/dL


(70-99) 78 mg/dL


(70-99)








Laboratory Tests








Test


 4/20/19


13:00 4/20/19


17:00 4/20/19


21:44 4/21/19


08:10


 


White Blood Count


 13.4 x10^3/uL


(4.0-11.0) 


 


 





 


Red Blood Count


 3.95 x10^6/uL


(3.50-5.40) 


 


 





 


Hemoglobin


 11.6 g/dL


(12.0-15.5) 


 


 





 


Hematocrit


 37.0 %


(36.0-47.0) 


 


 





 


Mean Corpuscular Volume 94 fL ()    


 


Mean Corpuscular Hemoglobin 30 pg (25-35)    


 


Mean Corpuscular Hemoglobin


Concent 31 g/dL


(31-37) 


 


 





 


Red Cell Distribution Width


 15.2 %


(11.5-14.5) 


 


 





 


Platelet Count


 236 x10^3/uL


(140-400) 


 


 





 


Neutrophils (%) (Auto) 75 % (31-73)    


 


Lymphocytes (%) (Auto) 12 % (24-48)    


 


Monocytes (%) (Auto) 13 % (0-9)    


 


Eosinophils (%) (Auto) 0 % (0-3)    


 


Basophils (%) (Auto) 1 % (0-3)    


 


Neutrophils # (Auto)


 10.0 x10^3uL


(1.8-7.7) 


 


 





 


Lymphocytes # (Auto)


 1.6 x10^3/uL


(1.0-4.8) 


 


 





 


Monocytes # (Auto)


 1.7 x10^3/uL


(0.0-1.1) 


 


 





 


Eosinophils # (Auto)


 0.0 x10^3/uL


(0.0-0.7) 


 


 





 


Basophils # (Auto)


 0.1 x10^3/uL


(0.0-0.2) 


 


 





 


Sodium Level


 137 mmol/L


(136-145) 


 


 





 


Potassium Level


 4.8 mmol/L


(3.5-5.1) 


 


 





 


Chloride Level


 101 mmol/L


() 


 


 





 


Carbon Dioxide Level


 28 mmol/L


(21-32) 


 


 





 


Anion Gap 8 (6-14)    


 


Blood Urea Nitrogen


 14 mg/dL


(7-20) 


 


 





 


Creatinine


 1.4 mg/dL


(0.6-1.0) 


 


 





 


Estimated GFR


(Cockcroft-Gault) 36.1 


 


 


 





 


BUN/Creatinine Ratio 10 (6-20)    


 


Glucose Level


 143 mg/dL


(70-99) 


 


 





 


Calcium Level


 9.7 mg/dL


(8.5-10.1) 


 


 





 


Magnesium Level


 1.6 mg/dL


(1.8-2.4) 


 


 





 


Total Bilirubin


 0.6 mg/dL


(0.2-1.0) 


 


 





 


Aspartate Amino Transf


(AST/SGOT) 38 U/L (15-37) 


 


 


 





 


Alanine Aminotransferase


(ALT/SGPT) 29 U/L (14-59) 


 


 


 





 


Alkaline Phosphatase


 104 U/L


() 


 


 





 


Total Protein


 6.9 g/dL


(6.4-8.2) 


 


 





 


Albumin


 3.6 g/dL


(3.4-5.0) 


 


 





 


Albumin/Globulin Ratio 1.1 (1.0-1.7)    


 


Glucose (Fingerstick)


 


 134 mg/dL


(70-99) 124 mg/dL


(70-99) 78 mg/dL


(70-99)








Medications





Current Medications


Ondansetron HCl (Zofran) 4 mg PRN Q6HRS  PRN IV NAUSEA/VOMITING;  Start 4/19/19 

at 07:00;  Stop 4/20/19 at 06:59;  Status DC


Fentanyl Citrate (Fentanyl 2ml Vial) 25 mcg PRN Q5MIN  PRN IV MILD PAIN;  Start 

4/19/19 at 07:00;  Stop 4/20/19 at 06:59;  Status DC


Fentanyl Citrate (Fentanyl 2ml Vial) 50 mcg PRN Q5MIN  PRN IV MODERATE TO 

SEVERE PAIN Last administered on 4/19/19at 09:26;  Start 4/19/19 at 07:00;  

Stop 4/20/19 at 06:59;  Status DC


Morphine Sulfate (Morphine Sulfate) 1 mg PRN Q10MIN  PRN IV SEVERE PAIN;  Start 

4/19/19 at 07:00;  Stop 4/20/19 at 06:59;  Status DC


Ringer's Solution 1,000 ml @  30 mls/hr Q24H IV  Last administered on 4/19/19at 

07:16;  Start 4/19/19 at 07:00;  Stop 4/19/19 at 18:59;  Status DC


Hydromorphone HCl (Dilaudid) 0.5 mg PRN Q10MIN  PRN IV SEV PAIN, Second choice;

  Start 4/19/19 at 07:00;  Stop 4/20/19 at 06:59;  Status DC


Prochlorperazine Edisylate (Compazine) 5 mg PACU PRN  PRN IV NAUSEA, MRX1;  

Start 4/19/19 at 07:00;  Stop 4/20/19 at 06:59;  Status DC


Levofloxacin/ Dextrose 100 ml @  100 mls/hr 1X PREOP  PRN IV PRIOR TO PROCEDURE 

Last administered on 4/19/19at 07:49;  Start 4/19/19 at 06:00;  Stop 4/19/19 at 

18:00;  Status DC


Bupivacaine HCl/ Epinephrine Bitart (Sensorcain-Mpf Epi 0.5%-1:150462) 30 ml STK

-MED ONCE .ROUTE  Last administered on 4/19/19at 08:15;  Start 4/19/19 at 05:52

;  Stop 4/19/19 at 06:52;  Status DC


Iohexol (Omnipaque 300 Mg/ml) 100 ml STK-MED ONCE .ROUTE ;  Start 4/19/19 at 05:

52;  Stop 4/19/19 at 06:52;  Status DC


Cellulose (Surgicel Hemostat 4x8) 1 each STK-MED ONCE .ROUTE ;  Start 4/19/19 

at 05:52;  Stop 4/19/19 at 06:53;  Status DC


Propofol 20 ml @ As Directed STK-MED ONCE IV ;  Start 4/19/19 at 07:35;  Stop 4/ 19/19 at 07:36;  Status DC


Dexamethasone Sodium Phosphate (Decadron) 20 mg STK-MED ONCE .ROUTE ;  Start 4/ 19/19 at 07:35;  Stop 4/19/19 at 07:36;  Status DC


Lidocaine HCl (Lidocaine Pf 2% Vial) 5 ml STK-MED ONCE .ROUTE ;  Start 4/19/19 

at 07:35;  Stop 4/19/19 at 07:36;  Status DC


Ondansetron HCl (Zofran) 4 mg STK-MED ONCE .ROUTE ;  Start 4/19/19 at 07:35;  

Stop 4/19/19 at 07:36;  Status DC


Rocuronium Bromide (Zemuron) 50 mg STK-MED ONCE .ROUTE ;  Start 4/19/19 at 07:36

;  Stop 4/19/19 at 07:37;  Status DC


Fentanyl Citrate (Fentanyl 2ml Vial) 100 mcg STK-MED ONCE .ROUTE ;  Start 4/19/ 19 at 07:36;  Stop 4/19/19 at 07:37;  Status DC


Glycopyrrolate (Robinul) 1 mg STK-MED ONCE .ROUTE ;  Start 4/19/19 at 08:23;  

Stop 4/19/19 at 08:24;  Status DC


Phenylephrine HCl (PHENYLEPHRINE in 0.9% NACL PF) 1 mg STK-MED ONCE IV ;  Start 

4/19/19 at 08:24;  Stop 4/19/19 at 08:25;  Status DC


Neostigmine Methylsulfate (Neostigmine Methylsulfate) 5 mg STK-MED ONCE .ROUTE 

;  Start 4/19/19 at 08:36;  Stop 4/19/19 at 08:37;  Status DC


Sodium Chloride (Normal Saline Flush) 3 ml QSHIFT  PRN IV AFTER MEDS AND BLOOD 

DRAWS;  Start 4/19/19 at 09:00


Morphine Sulfate (Morphine Sulfate) 2 mg PRN Q3HRS  PRN IV PAIN;  Start 4/19/19 

at 09:00


Oxycodone/ Acetaminophen (Percocet 5/325) 1 tab PRN Q4HRS  PRN PO MILD PAIN, 

1ST CHOICE;  Start 4/19/19 at 09:00


Oxycodone/ Acetaminophen (Percocet 5/325) 2 tab PRN Q4HRS  PRN PO MODERATE PAIN

, SEVERE PAIN;  Start 4/19/19 at 09:00


Ondansetron HCl (Zofran) 4 mg PRN Q6HRS  PRN IV NAUSEA, 1ST CHOICE Last 

administered on 4/19/19at 10:29;  Start 4/19/19 at 09:00


Dextrose/Lactated Ringer's 1,000 ml @  75 mls/hr [id number] IV  Last administered 

on 4/19/19at 19:41;  Start 4/19/19 at 08:46;  Stop 4/20/19 at 19:12;  Status DC


Sevoflurane (Ultane) 30 ml STK-MED ONCE IH ;  Start 4/19/19 at 08:51;  Stop 4/19 /19 at 08:52;  Status DC


Fentanyl Citrate (Fentanyl 2ml Vial) 100 mcg STK-MED ONCE .ROUTE ;  Start 4/19/ 19 at 09:06;  Stop 4/19/19 at 09:07;  Status DC


Prochlorperazine Edisylate (Compazine) 10 mg 1X  ONCE IV  Last administered on 4 /19/19at 12:45;  Start 4/19/19 at 12:45;  Stop 4/19/19 at 12:46;  Status DC


Magnesium Sulfate 50 ml @ 25 mls/hr 1X  ONCE IV  Last administered on 4/20/19at 

16:34;  Start 4/20/19 at 14:30;  Stop 4/20/19 at 16:29;  Status DC


Sodium Chloride 1,000 ml @  125 mls/hr Q8H IV  Last administered on 4/20/19at 16

:36;  Start 4/20/19 at 14:15;  Stop 4/20/19 at 19:12;  Status DC


Polyethylene Glycol (miraLAX PACKET) 17 gm BID  PRN PO CONSTIPATION;  Start 4/20 /19 at 14:45


Docusate Sodium (Colace) 100 mg BID PO  Last administered on 4/21/19at 08:52;  

Start 4/20/19 at 14:45


Magnesium Hydroxide (Milk Of Magnesia) 2,400 mg 1X  ONCE PO ;  Start 4/20/19 at 

15:30;  Stop 4/20/19 at 15:31;  Status DC


Ascorbic Acid (Vitamin C) 500 mg DAILY PO  Last administered on 4/21/19at 08:52

;  Start 4/20/19 at 16:00


Aspirin (Ecotrin) 325 mg PRN DAILY  PRN PO PAIN;  Start 4/20/19 at 15:15


Bupropion HCl (Wellbutrin Sr) 100 mg DAILY PO  Last administered on 4/21/19at 08

:52;  Start 4/20/19 at 16:00


Cyanocobalamin (Vitamin B-12) 1,000 mcg DAILY PO  Last administered on 4/21/ 19at 08:52;  Start 4/20/19 at 16:00


Ferrous Sulfate (Feosol) 325 mg BIDWMEALS PO  Last administered on 4/21/19at 08:

52;  Start 4/20/19 at 17:00


Vitamin D (Vitamin D3) 5,000 unit DAILY PO  Last administered on 4/21/19at 08:52

;  Start 4/20/19 at 16:00


Levothyroxine Sodium (Synthroid) 25 mcg DAILY06 PO  Last administered on 4/21/ 19at 05:48;  Start 4/21/19 at 06:00


Pantoprazole Sodium (Protonix) 40 mg DAILYAC PO  Last administered on 4/21/19at 

08:52;  Start 4/21/19 at 07:30


Insulin Human Lispro (HumaLOG) 0-5 UNITS TIDWMEALS SQ ;  Start 4/20/19 at 17:00


Dextrose (Dextrose 50%-Water Syringe) 12.5 gm PRN Q15MIN  PRN IV SEE COMMENTS;  

Start 4/20/19 at 15:15


Heparin Sodium (Porcine) (Heparin Sodium) 5,000 unit Q8HRS SQ  Last 

administered on 4/21/19at 05:52;  Start 4/20/19 at 16:00





Active Scripts


Active


Reported


Vitamin C (Ascorbic Acid) 500 Mg Tablet 500 Mg PO DAILY


Vitamin D3 (Cholecalciferol (Vitamin D3)) 5,000 Unit Tablet 5,000 Unit PO DAILY


Vitamin B-12 (Cyanocobalamin (Vitamin B-12)) 1,000 Mcg Tablet 1,000 Mcg PO DAILY


Ferrous Sulfate 325 Mg Tablet 325 Mg PO BID


Omeprazole 40 Mg Capsule.dr 40 Mg PO DAILY


Prednisolone Acetate 5 Ml Drops.susp 5 Ml OP BID


Aspirin Ec (Aspirin) 325 Mg Tablet.dr 325 Mg PO PRN DAILY PRN


Synthroid (Levothyroxine Sodium) 25 Mcg Tablet 1 Tab PO DAILY


Cyclopentolate Hcl 2 Ml Drops   


Bupropion Hcl Sr (Bupropion Hcl) 100 Mg Tablet.er   DAILY


Vitals/I & O





Vital Sign - Last 24 Hours








 4/20/19 4/20/19 4/20/19 4/20/19





 11:00 12:35 12:40 13:25


 


Temp 97.6   97.6





 97.6   97.6


 


Pulse 73 68 68 90


 


Resp 18   20


 


B/P (MAP) 102/40 (60) 58/30 (39) 58/25 (36) 111/56 (74)


 


Pulse Ox 96   93


 


O2 Delivery Room Air   Room Air


 


    





    





 4/20/19 4/20/19 4/20/19 4/20/19





 15:31 19:25 19:59 20:16


 


Temp 98.3   98.8





 98.3   98.8


 


Pulse 95   92


 


Resp 18   18


 


B/P (MAP) 129/61 (83)   129/60 (83)


 


Pulse Ox    93


 


O2 Delivery Room Air Room Air Room Air Room Air


 


    





    





 4/20/19 4/21/19 4/21/19 4/21/19





 22:29 03:59 04:10 07:20


 


Temp 99.3  98.4 98.8





 99.3  98.4 98.8


 


Pulse 97  90 83


 


Resp 19  18 


 


B/P (MAP) 134/61 (85)  125/58 (80) 135/61 (85)


 


Pulse Ox 94  91 92


 


O2 Delivery Room Air Room Air Room Air Room Air


 


    





    





 4/21/19   





 07:45   


 


O2 Delivery Room Air   














Intake and Output   


 


 4/20/19 4/20/19 4/21/19





 15:00 23:00 07:00


 


Intake Total  600 ml 1045 ml


 


Balance  600 ml 1045 ml

















ROBLES TRAN MD Apr 21, 2019 09:33

## 2019-04-22 NOTE — PATHOLOGY
[facility] Accession Number: [id number]

.                                                                01

Material submitted:                                        .

gallbladder - GALLBLADDER

.                                                                01

Clinical history:                                          .

Cholelithiasis

.                                                                02

**********************************************************************

Diagnosis:

Gallbladder, cholecystectomy:

- Chronic cholecystitis.

- Cholelithiasis.

(SKM:deborah; 04/22/2019)

QMS/04/22/2019

**********************************************************************

.                                                                02

Electronically signed:                                     .

Quincy Tidwell MD, Pathologist

NPI- [id number]

.                                                                01

Gross description:                                         .

The specimen is received in formalin, labeled "Tavia Nunn,

gallbladder" and consists of an intact pink-gray, smooth, and shiny

gallbladder measuring 6.0 cm in length and up to 2.2 cm in diameter.  The

margin is inked black.  Opening reveals a lumen filled with viscous green

bile and multiple yellow calculi ranging from 0.1-0.4 cm.  The mucosa is

pink-tan with yellow flecks and an average wall thickness of 0.1 cm.  No

masses are identified.  Representative sections are submitted in A1.

(SDY; 4/19/2019)

SYU/SYU

.                                                                02

Pathologist provided ICD-10:

K80.10

.                                                                02

CPT                                                        .

[id number]

Specimen Comment: A courtesy copy of this report has been sent to

Specimen Comment: [phone], [phone].

Specimen Comment: Report sent to  / DR KRISHNAMURTHY

***Performed at:  01

   [facility]

   [address], [place]  [id number]

   MD Julio César Mirza MD Phone:  [phone]

***Performed at:  02

   [facility]

   [address], [place]  [id number]

   MD Daniel Reyes MD Phone:  [phone]